# Patient Record
Sex: MALE | Race: WHITE | NOT HISPANIC OR LATINO | Employment: OTHER | URBAN - METROPOLITAN AREA
[De-identification: names, ages, dates, MRNs, and addresses within clinical notes are randomized per-mention and may not be internally consistent; named-entity substitution may affect disease eponyms.]

---

## 2020-11-17 ENCOUNTER — E-VISIT (OUTPATIENT)
Dept: FAMILY MEDICINE CLINIC | Facility: TELEHEALTH | Age: 55
End: 2020-11-17

## 2020-11-17 DIAGNOSIS — R05.9 COUGH: Primary | ICD-10-CM

## 2020-11-17 PROCEDURE — 99422 OL DIG E/M SVC 11-20 MIN: CPT | Performed by: NURSE PRACTITIONER

## 2020-11-17 RX ORDER — BENZONATATE 100 MG/1
100 CAPSULE ORAL 3 TIMES DAILY PRN
Qty: 21 CAPSULE | Refills: 0 | OUTPATIENT
Start: 2020-11-17 | End: 2021-04-17

## 2020-11-17 NOTE — PATIENT INSTRUCTIONS
-Go to nearest [facility] for your covid test, wear your mask, and call when you arrive. We will call you in 1-5 days with your results and they will automatically load to [employer] once resulted. If your symptoms change, get worse, or do not improve in 3-5 go to urgent care for evaluation.    Please self-isolate for 10 days from the start of your symptoms. You can end your quarantine once it has been 10 days from symptom onset, your symptoms have improved, you have been without fever for 24 hours and you have not taken fever-reducing meds for 24 hours.      How to Quarantine at Home  Information for Patients and Families    These instructions are for people with confirmed or suspected COVID-19 who do not need to be hospitalized and those with confirmed COVID-19 who were hospitalized and discharged to care for themselves at home.    If you were tested through the Health Department  The Health Department will monitor your wellbeing.  If it is determined that you do not need to be hospitalized and can be isolated at home, you will be monitored by staff from your local or state health department.     If you were tested through a Commercial [facility]  You will need to monitor yourself and report changes in your symptoms to your doctor.  See the section below called Monitor Your Symptoms.    Follow these steps until a healthcare provider or local or state health department says you can return to your normal activities.    Stay home except to get medical care  • Restrict activities outside your home, except for getting medical care.   • Do not go to work, school, or public areas.   • Avoid using public transportation, ride-sharing, or taxis.    Separate yourself from other people and animals in your home  People  As much as possible, you should stay in a specific room and away from other people in your home. Also, you should use a separate bathroom, if available.    Animals  You should restrict contact with pets and  other animals while you are sick with COVID-19, just like you would around other people. When possible, have another member of your household care for your animals while you are sick. If you are sick with COVID-19, avoid contact with your pet, including petting, snuggling, being kissed or licked, and sharing food. If you must care for your pet or be around animals while you are sick, wash your hands before and after you interact with pets and wear a facemask. See COVID-19 and Animals for more information.    Call ahead before visiting your doctor  If you have a medical appointment, call the healthcare provider and tell them that you have or may have COVID-19. This information will help the healthcare provider’s office take steps to keep other people from getting infected or exposed.    Wear a facemask  You should wear a facemask when you are around other people (e.g., sharing a room or vehicle) or pets and before you enter a healthcare provider’s office.     If you are not able to wear a facemask (for example, because it causes trouble breathing), then people who live with you should not stay in the same room with you, or they should wear a facemask if they enter your room.    Cover your coughs and sneezes  • Cover your mouth and nose with a tissue when you cough or sneeze.   • Throw used tissues in a lined trash can.   • Immediately wash your hands with soap and water for at least 20 seconds or, if soap and water are not available, clean your hands with an alcohol-based hand  that contains at least 60% alcohol.    Clean your hands often  • Wash your hands often with soap and water for at least 20 seconds, especially after blowing your nose, coughing, or sneezing; going to the bathroom; and before eating or preparing food.     • If soap and water are not readily available, use an alcohol-based hand  with at least 60% alcohol, covering all surfaces of your hands and rubbing them together until they  feel dry.    • Soap and water are the best option if hands are visibly dirty. Avoid touching your eyes, nose, and mouth with unwashed hands.    Avoid sharing personal household items  • You should not share dishes, drinking glasses, cups, eating utensils, towels, or bedding with other people or pets in your home.   • After using these items, they should be washed thoroughly with soap and water.    Clean all “high-touch” surfaces everyday  • High touch surfaces include counters, tabletops, doorknobs, bathroom fixtures, toilets, phones, keyboards, tablets, and bedside tables.   • Also, clean any surfaces that may have blood, stool, or body fluids on them.   • Use a household cleaning spray or wipe, according to the label instructions. Labels contain instructions for safe and effective use of the cleaning product, including precautions you should take when applying the product, such as wearing gloves and making sure you have good ventilation during use of the product.    Monitor your symptoms  • Seek prompt medical attention if your illness is worsening (e.g., difficulty breathing).   • Before seeking care, call your healthcare provider and tell them that you have, or are being evaluated for, COVID-19.   • Put on a facemask before you enter the facility.     • These steps will help the healthcare provider’s office to keep other people in the office or waiting room from getting infected or exposed.   • Persons who are placed under active monitoring or facilitated self-monitoring should follow instructions provided by their local health department or occupational health professionals, as appropriate.  • If you have a medical emergency and need to call 911, notify the dispatch personnel that you have, or are being evaluated for COVID-19. If possible, put on a facemask before emergency medical services arrive.    Discontinuing home isolation  Patients with confirmed COVID-19 should remain under home isolation precautions  until the risk of secondary transmission to others is thought to be low. The decision to discontinue home isolation precautions should be made on a case-by-case basis, in consultation with healthcare providers and state and local health departments.    The below content are for household members, intimate partners, and caregivers of a patient with symptomatic laboratory-confirmed COVID-19 or a patient under investigation:    Household members, intimate partners, and caregivers may have close contact with a person with symptomatic, laboratory-confirmed COVID-19 or a person under investigation.     Close contacts should monitor their health; they should call their healthcare provider right away if they develop symptoms suggestive of COVID-19 (e.g., fever, cough, shortness of breath)     Close contacts should also follow these recommendations:  • Make sure that you understand and can help the patient follow their healthcare provider’s instructions for medication(s) and care. You should help the patient with basic needs in the home and provide support for getting groceries, prescriptions, and other personal needs.  • Monitor the patient’s symptoms. If the patient is getting sicker, call his or her healthcare provider and tell them that the patient has laboratory-confirmed COVID-19. This will help the healthcare provider’s office take steps to keep other people in the office or waiting room from getting infected. Ask the healthcare provider to call the local or state health department for additional guidance. If the patient has a medical emergency and you need to call 911, notify the dispatch personnel that the patient has, or is being evaluated for COVID-19.  • Household members should stay in another room or be  from the patient as much as possible. Household members should use a separate bedroom and bathroom, if available.  • Prohibit visitors who do not have an essential need to be in the home.  • Household  members should care for any pets in the home. Do not handle pets or other animals while sick.  For more information, see COVID-19 and Animals.  • Make sure that shared spaces in the home have good air flow, such as by an air conditioner or an opened window, weather permitting.  • Perform hand hygiene frequently. Wash your hands often with soap and water for at least 20 seconds or use an alcohol-based hand  that contains 60 to 95% alcohol, covering all surfaces of your hands and rubbing them together until they feel dry. Soap and water should be used preferentially if hands are visibly dirty.  • Avoid touching your eyes, nose, and mouth with unwashed hands.  • The patient should wear a facemask when you are around other people. If the patient is not able to wear a facemask (for example, because it causes trouble breathing), you, as the caregiver, should wear a mask when you are in the same room as the patient.  • Wear a disposable facemask and gloves when you touch or have contact with the patient’s blood, stool, or body fluids, such as saliva, sputum, nasal mucus, vomit, or urine.   o Throw out disposable facemasks and gloves after using them. Do not reuse.  o When removing personal protective equipment, first remove and dispose of gloves. Then, immediately clean your hands with soap and water or alcohol-based hand . Next, remove and dispose of facemask, and immediately clean your hands again with soap and water or alcohol-based hand .  • Avoid sharing household items with the patient. You should not share dishes, drinking glasses, cups, eating utensils, towels, bedding, or other items. After the patient uses these items, you should wash them thoroughly (see below “Wash laundry thoroughly”).  • Clean all “high-touch” surfaces, such as counters, tabletops, doorknobs, bathroom fixtures, toilets, phones, keyboards, tablets, and bedside tables, every day. Also, clean any surfaces that may have  blood, stool, or body fluids on them.   o Use a household cleaning spray or wipe, according to the label instructions. Labels contain instructions for safe and effective use of the cleaning product including precautions you should take when applying the product, such as wearing gloves and making sure you have good ventilation during use of the product.  • Wash laundry thoroughly.   o Immediately remove and wash clothes or bedding that have blood, stool, or body fluids on them.  o Wear disposable gloves while handling soiled items and keep soiled items away from your body. Clean your hands (with soap and water or an alcohol-based hand ) immediately after removing your gloves.  o Read and follow directions on labels of laundry or clothing items and detergent. In general, using a normal laundry detergent according to washing machine instructions and dry thoroughly using the warmest temperatures recommended on the clothing label.  • Place all used disposable gloves, facemasks, and other contaminated items in a lined container before disposing of them with other household waste. Clean your hands (with soap and water or an alcohol-based hand ) immediately after handling these items. Soap and water should be used preferentially if hands are visibly dirty.  • Discuss any additional questions with your state or local health department or healthcare provider.    Adapted from information provided by the Centers for Disease Control and Prevention.  For more information, visit https://www.cdc.gov/coronavirus/2019-ncov/hcp/guidance-prevent-spread.html    Cough, Adult  Coughing is a reflex that clears your throat and your airways (respiratory system). Coughing helps to heal and protect your lungs. It is normal to cough occasionally, but a cough that happens with other symptoms or lasts a long time may be a sign of a condition that needs treatment. An acute cough may only last 2-3 weeks, while a chronic cough may  last 8 or more weeks.  Coughing is commonly caused by:  · Infection of the respiratory systemby viruses or bacteria.  · Breathing in substances that irritate your lungs.  · Allergies.  · Asthma.  · Mucus that runs down the back of your throat (postnasal drip).  · Smoking.  · Acid backing up from the stomach into the esophagus (gastroesophageal reflux).  · Certain medicines.  · Chronic lung problems.  · Other medical conditions such as heart failure or a blood clot in the lung (pulmonary embolism).  Follow these instructions at home:  Medicines  · Take over-the-counter and prescription medicines only as told by your health care provider.  · Talk with your health care provider before you take a cough suppressant medicine.  Lifestyle    · Avoid cigarette smoke. Do not use any products that contain nicotine or tobacco, such as cigarettes, e-cigarettes, and chewing tobacco. If you need help quitting, ask your health care provider.  · Drink enough fluid to keep your urine pale yellow.  · Avoid caffeine.  · Do not drink alcohol if your health care provider tells you not to drink.  General instructions    · Pay close attention to changes in your cough. Tell your health care provider about them.  · Always cover your mouth when you cough.  · Avoid things that make you cough, such as perfume, candles, cleaning products, or campfire or tobacco smoke.  · If the air is dry, use a cool mist vaporizer or humidifier in your bedroom or your home to help loosen secretions.  · If your cough is worse at night, try to sleep in a semi-upright position.  · Rest as needed.  · Keep all follow-up visits as told by your health care provider. This is important.  Contact a health care provider if you:  · Have new symptoms.  · Cough up pus.  · Have a cough that does not get better after 2-3 weeks or gets worse.  · Cannot control your cough with cough suppressant medicines and you are losing sleep.  · Have pain that gets worse or pain that is not  helped with medicine.  · Have a fever.  · Have unexplained weight loss.  · Have night sweats.  Get help right away if:  · You cough up blood.  · You have difficulty breathing.  · Your heartbeat is very fast.  These symptoms may represent a serious problem that is an emergency. Do not wait to see if the symptoms will go away. Get medical help right away. Call your local emergency services (911 in the [place] Do not drive yourself to the hospital.  Summary  · Coughing is a reflex that clears your throat and your airways. It is normal to cough occasionally, but a cough that happens with other symptoms or lasts a long time may be a sign of a condition that needs treatment.  · Take over-the-counter and prescription medicines only as told by your health care provider.  · Always cover your mouth when you cough.  · Contact a health care provider if you have new symptoms or a cough that does not get better after 2-3 weeks or gets worse.  This information is not intended to replace advice given to you by your health care provider. Make sure you discuss any questions you have with your health care provider.  Document Released: 06/15/2012 Document Revised: 01/06/2020 Document Reviewed: 01/06/2020  [employer] Patient Education © 2020 [employer]  Benzonatate capsules  What is this medicine?  BENZONATATE (tye DEE na tony) is used to treat cough.  This medicine may be used for other purposes; ask your health care provider or pharmacist if you have questions.  COMMON BRAND NAME(S): Ollie Benton  What should I tell my health care provider before I take this medicine?  They need to know if you have any of these conditions:  · kidney or liver disease  · an unusual or allergic reaction to benzonatate, anesthetics, other medicines, foods, dyes, or preservatives  · pregnant or trying to get pregnant  · breast-feeding  How should I use this medicine?  Take this medicine by mouth with a glass of water. Follow the directions on  the prescription label. Avoid breaking, chewing, or sucking the capsule, as this can cause serious side effects. Take your medicine at regular intervals. Do not take your medicine more often than directed.  Talk to your pediatrician regarding the use of this medicine in children. While this drug may be prescribed for children as young as 10 years old for selected conditions, precautions do apply.  Overdosage: If you think you have taken too much of this medicine contact a poison control center or emergency room at once.  NOTE: This medicine is only for you. Do not share this medicine with others.  What if I miss a dose?  If you miss a dose, take it as soon as you can. If it is almost time for your next dose, take only that dose. Do not take double or extra doses.  What may interact with this medicine?  Do not take this medicine with any of the following medications:  · MAOIs like Carbex, Eldepryl, Marplan, Nardil, and Parnate  This list may not describe all possible interactions. Give your health care provider a list of all the medicines, herbs, non-prescription drugs, or dietary supplements you use. Also tell them if you smoke, drink alcohol, or use illegal drugs. Some items may interact with your medicine.  What should I watch for while using this medicine?  Tell your doctor if your symptoms do not improve or if they get worse. If you have a high fever, skin rash, or headache, see your health care professional.  You may get drowsy or dizzy. Do not drive, use machinery, or do anything that needs mental alertness until you know how this medicine affects you. Do not sit or stand up quickly, especially if you are an older patient. This reduces the risk of dizzy or fainting spells.  What side effects may I notice from receiving this medicine?  Side effects that you should report to your doctor or health care professional as soon as possible:  · allergic reactions like skin rash, itching or hives, swelling of the face,  lips, or tongue  · breathing problems  · chest pain  · confusion or hallucinations  · irregular heartbeat  · numbness of mouth or throat  · seizures  Side effects that usually do not require medical attention (report to your doctor or health care professional if they continue or are bothersome):  · burning feeling in the eyes  · constipation  · headache  · nasal congestion  · stomach upset  This list may not describe all possible side effects. Call your doctor for medical advice about side effects. You may report side effects to FDA at 1-428-JTZ-3654.  Where should I keep my medicine?  Keep out of the reach of children.  Store at room temperature between 15 and 30 degrees C (59 and 86 degrees F). Keep tightly closed. Protect from light and moisture. Throw away any unused medicine after the expiration date.  NOTE: This sheet is a summary. It may not cover all possible information. If you have questions about this medicine, talk to your doctor, pharmacist, or health care provider.  © 2020 [employer] (2009-03-18 14:52:56)

## 2020-11-17 NOTE — PROGRESS NOTES
I reviewed the patient's e-visit.  I ordered a covid test and tessalon pearles. I recommend a 10 day quarantine based on CDC guidelines. Other recommendations found in AVS.    I spent 11-20 minutes in the patient's chart.

## 2021-04-17 ENCOUNTER — APPOINTMENT (OUTPATIENT)
Dept: GENERAL RADIOLOGY | Facility: HOSPITAL | Age: 56
End: 2021-04-17

## 2021-04-17 ENCOUNTER — HOSPITAL ENCOUNTER (EMERGENCY)
Facility: HOSPITAL | Age: 56
Discharge: HOME | End: 2021-04-17
Admitting: EMERGENCY MEDICINE

## 2021-04-17 VITALS
WEIGHT: 211.64 LBS | OXYGEN SATURATION: 99 % | BODY MASS INDEX: 28.67 KG/M2 | DIASTOLIC BLOOD PRESSURE: 77 MMHG | SYSTOLIC BLOOD PRESSURE: 136 MMHG | HEIGHT: 72 IN | HEART RATE: 88 BPM | RESPIRATION RATE: 16 BRPM | TEMPERATURE: 98.2 F

## 2021-04-17 DIAGNOSIS — S39.012A STRAIN OF LUMBAR REGION, INITIAL ENCOUNTER: Primary | ICD-10-CM

## 2021-04-17 PROCEDURE — 63710000001 ONDANSETRON ODT 4 MG TABLET DISPERSIBLE: Performed by: NURSE PRACTITIONER

## 2021-04-17 PROCEDURE — 96372 THER/PROPH/DIAG INJ SC/IM: CPT

## 2021-04-17 PROCEDURE — 99284 EMERGENCY DEPT VISIT MOD MDM: CPT

## 2021-04-17 PROCEDURE — 25010000002 KETOROLAC TROMETHAMINE PER 15 MG: Performed by: NURSE PRACTITIONER

## 2021-04-17 PROCEDURE — 25010000002 HYDROMORPHONE PER 4 MG: Performed by: NURSE PRACTITIONER

## 2021-04-17 RX ORDER — KETOROLAC TROMETHAMINE 30 MG/ML
30 INJECTION, SOLUTION INTRAMUSCULAR; INTRAVENOUS ONCE
Status: COMPLETED | OUTPATIENT
Start: 2021-04-17 | End: 2021-04-17

## 2021-04-17 RX ORDER — HYDROMORPHONE HCL 110MG/55ML
1 PATIENT CONTROLLED ANALGESIA SYRINGE INTRAVENOUS ONCE
Status: COMPLETED | OUTPATIENT
Start: 2021-04-17 | End: 2021-04-17

## 2021-04-17 RX ORDER — ONDANSETRON 4 MG/1
4 TABLET, ORALLY DISINTEGRATING ORAL ONCE
Status: COMPLETED | OUTPATIENT
Start: 2021-04-17 | End: 2021-04-17

## 2021-04-17 RX ADMIN — KETOROLAC TROMETHAMINE 30 MG: 30 INJECTION, SOLUTION INTRAMUSCULAR at 14:11

## 2021-04-17 RX ADMIN — ONDANSETRON 4 MG: 4 TABLET, ORALLY DISINTEGRATING ORAL at 14:11

## 2021-04-17 RX ADMIN — HYDROMORPHONE HYDROCHLORIDE 1 MG: 2 INJECTION, SOLUTION INTRAMUSCULAR; INTRAVENOUS; SUBCUTANEOUS at 14:11

## 2021-04-17 NOTE — ED PROVIDER NOTES
"Subjective   Chief complaint: Back pain      Context: Patient is a 25-year-old male who comes in complaining of low back pain that is worse and reproducible with range of motion.  He denies any saddle anesthesia bowel or bladder incontinence or retention.  He denies any testicular pain or numbness.  He states he has been ambulatory and weightbearing without any weakness or falls.  He states he has had some recent lifting bending and twisting over the last couple days without any known inciting incident with a gradual onset of pain.  He states he took an ibuprofen without much relief.  He went to urgent care this morning was given prescriptions for Flexeril naproxen and prednisone which she has not yet started.  He states he has not had any decompensation of his symptoms but wanted to \"get an MRI.\"  He denies any history of chemoradiation osteoporosis IV drug use chronic steroid use fever or systemic illness.    Duration: 3 days    Timing: Waxes and wanes    Severity: Moderate to severe    Associated symptoms: Reproducible and worse with range of motion          PCP:               Review of Systems   Constitutional: Negative for fever.   Respiratory: Negative.    Gastrointestinal: Negative.    Genitourinary: Negative.    Musculoskeletal: Positive for back pain. Negative for neck stiffness.   Allergic/Immunologic: Negative for immunocompromised state.   Neurological: Negative.    Hematological: Does not bruise/bleed easily.       No past medical history on file.    Allergies   Allergen Reactions   • Codeine Hives       No past surgical history on file.    No family history on file.    Social History     Socioeconomic History   • Marital status: Single     Spouse name: Not on file   • Number of children: Not on file   • Years of education: Not on file   • Highest education level: Not on file   Tobacco Use   • Smoking status: Never Smoker   • Smokeless tobacco: Never Used   Vaping Use   • Vaping Use: Never used "   Substance and Sexual Activity   • Alcohol use: Not Currently   • Drug use: Defer   • Sexual activity: Defer           Objective   Physical Exam     Vital signs and traige nurse note reviewed.  Constitutional:  Awake, alert; well developed and well nourished.  No acute distress, the patient is examined in hospital gown.  HEENT:  Normocephalic, atraumatic; pupils are PERRL with intact EOM; oropharynx is pink and moist without edema or erythema.  Neck: Supple, full range of motion without pain;   Cardiovascular: Regular rate and rhythm, normal S1-S2. .  Pulmonary: Respiratory effort regular, nonlabored; breath sounds CTA without wheezing or rhonchi.  Abdomen: Soft, nontender, nondistended with normoactive bowel sounds; no rebound or guarding.  No CVAT  Musculoskeletal: Independent range of motion of all extremities, no palpable tenderness or edema.   Back:  Spine is midline and without midline tenderness on palpation of cervical, thoracic, and lumbar spine; paraspinal musculature is nontender and without soft tissue swelling, overlying ecchymosis or erythema. ROM elicits pain,  Distal muscle strength is 5/5.  Patient does have some pain to palpation in the lumbar area when flexing forward approximately 20 degrees but not when standing straight  Neuro: Alert oriented x3, speech is clear and appropriate. DTRs are symmetrical bilateral lower extremity, negative Babinski BLE, negative straight leg raise BLE, strong and equal dorsiflexion of bilateral great toes L4, L5, S1 motor sensory intact.        Procedures           ED Course  ED Course as of Apr 17 1454   Sat Apr 17, 2021   1439 Rn reports pt refused xr      [JW]      ED Course User Index  [JW] Althea Hernández, BRITTNEY           Labs Reviewed - No data to display  Medications   ketorolac (TORADOL) injection 30 mg (30 mg Intramuscular Given 4/17/21 1411)   ondansetron ODT (ZOFRAN-ODT) disintegrating tablet 4 mg (4 mg Oral Given 4/17/21 1411)   HYDROmorphone  (DILAUDID) injection 1 mg (1 mg Subcutaneous Given 4/17/21 1411)     No radiology results for the last day  Prior to Admission medications    Medication Sig Start Date End Date Taking? Authorizing Provider   cyclobenzaprine (FLEXERIL) 10 MG tablet Take 1 tablet by mouth 3 (Three) Times a Day As Needed for Muscle Spasms. 4/17/21   Nighat Melissa APRN   naproxen (EC NAPROSYN) 500 MG EC tablet Take 1 tablet by mouth 2 (Two) Times a Day As Needed (pain). 4/17/21   Nighat Melissa APRN   predniSONE (DELTASONE) 10 MG (21) dose pack Take  by mouth Daily. Use as directed on package 4/17/21   Nighat Melissa APRN   benzonatate (Tessalon Perles) 100 MG capsule Take 1 capsule by mouth 3 (Three) Times a Day As Needed for Cough. 11/17/20 4/17/21  Holly Zamora APRN   cyclobenzaprine (FLEXERIL) 10 MG tablet 1 tablet p.o. every 8 hours as needed 1/28/20 4/17/21  Héctor Darnell MD   ibuprofen (ADVIL,MOTRIN) 800 MG tablet 1 tablet p.o. every 8 hours with food as needed pain 1/28/20 4/17/21  Héctor Darnell MD                                     MDM  Number of Diagnoses or Management Options  Strain of lumbar region, initial encounter  Diagnosis management comments: Chart review: Seen in urgent care 4/17/2021      Comorbidities:  has no past medical history on file.  Differentials: Strain sciatica herniation bulging disc not all inclusive of differentials considered  Radiology interpretation: Refused    Appropriate PPE worn during exam.  Patient was given analgesics.  He refused x-ray.  Discussed nonemergent MRI imaging would not be done in the ER and he would need follow-up, he verbalizes understanding and continues to refuse x-ray and states he will follow-up with his PCP for imaging.    i discussed findings with patient who voices understanding of discharge instructions, signs and symptoms requiring return to ED; discharged improved and in stable condition with follow up for re-evaluation.        Final  diagnoses:   Strain of lumbar region, initial encounter       ED Disposition  ED Disposition     ED Disposition Condition Comment    Discharge Stable           PATIENT CONNECTION - [facility] [address]  [phone]  Schedule an appointment as soon as possible for a visit            Medication List      No changes were made to your prescriptions during this visit.          Althea Hernández, APRN  04/17/21 1458

## 2021-04-17 NOTE — ED NOTES
"Patient reports to X ray tech while this nurse is in the room\" I do not want an Xray I want an MRI, last time I had an Xray it did not show anything wrong and they said I might need an MRI\" provider notified     Leticia Coates RN  04/17/21 2619    "

## 2021-04-17 NOTE — DISCHARGE INSTRUCTIONS
Medications as previously prescribed ; light massage and range of motion exercises and improve the discomfort; back exercises 2-3 times daily; no heavy lifting, repeated bending or stooping for 3-5 days, gradually increase activity as tolerated by pain; return for numbness to the inner thigh, genitals or rectum, loss of control of your bowels or bladder, inability to urinate or worsening pain or symptoms. Follow up with primary care physician if no improvement in 2-3 days  Previously prescribed

## 2021-06-18 ENCOUNTER — HOSPITAL ENCOUNTER (EMERGENCY)
Facility: HOSPITAL | Age: 56
Discharge: HOME | End: 2021-06-18
Admitting: EMERGENCY MEDICINE

## 2021-06-18 VITALS
SYSTOLIC BLOOD PRESSURE: 131 MMHG | RESPIRATION RATE: 16 BRPM | TEMPERATURE: 97.8 F | BODY MASS INDEX: 28.46 KG/M2 | OXYGEN SATURATION: 98 % | HEART RATE: 86 BPM | DIASTOLIC BLOOD PRESSURE: 84 MMHG | HEIGHT: 72 IN | WEIGHT: 210.1 LBS

## 2021-06-18 DIAGNOSIS — T15.92XA FOREIGN BODY OF LEFT EYE, INITIAL ENCOUNTER: ICD-10-CM

## 2021-06-18 DIAGNOSIS — H16.203 KERATOCONJUNCTIVITIS OF BOTH EYES: Primary | ICD-10-CM

## 2021-06-18 PROCEDURE — 99283 EMERGENCY DEPT VISIT LOW MDM: CPT

## 2021-06-18 RX ORDER — PROPARACAINE HYDROCHLORIDE 5 MG/ML
2 SOLUTION/ DROPS OPHTHALMIC ONCE
Status: COMPLETED | OUTPATIENT
Start: 2021-06-18 | End: 2021-06-18

## 2021-06-18 RX ADMIN — PROPARACAINE HYDROCHLORIDE 2 DROP: 5 SOLUTION/ DROPS OPHTHALMIC at 19:40

## 2021-06-18 RX ADMIN — FLUORESCEIN SODIUM 1 STRIP: 1 STRIP OPHTHALMIC at 19:40

## 2021-06-18 NOTE — DISCHARGE INSTRUCTIONS
Purchase over-the-counter artificial tears and use as directed.  Wear safety glasses when working.  It is important to establish a primary care provider for your primary care needs.  Schedule follow-up with Dr. Moore.  Schedule follow-up with Dr. Olson on Monday.  Return to the ER for new or worsening symptoms.

## 2021-06-18 NOTE — ED PROVIDER NOTES
"Subjective   55-year-old male presents with complaint of foreign body sensation under both of his eyelids.  Patient reports that he began \"rubbing\" his eyes approximately 1 week ago and thinks that he got an eyelash in there.  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  Denies fever sweats chills.  Denies visual disturbance.  Denies drainage.    1. Location: bilateral eyelids  2. Quality: FB sensation  3. Severity: moderate  4. Worsening factors: rubbing  5. Alleviating factors: rest  6. Onset: 1 week  7. Radiation: denies  8. Frequency: constant   9. Co-morbidities: No past medical history on file.  10. Source: patient and spouse            Review of Systems   Constitutional: Negative for chills, diaphoresis and fever.   HENT: Negative for congestion, ear discharge, postnasal drip, rhinorrhea, sinus pain, sore throat, trouble swallowing and voice change.    Eyes: Positive for pain and redness. Negative for photophobia, discharge, itching and visual disturbance.   Musculoskeletal: Negative for neck pain.   Skin: Negative for rash.   Hematological: Negative for adenopathy.   All other systems reviewed and are negative.      No past medical history on file.    Allergies   Allergen Reactions   • Codeine Hives       No past surgical history on file.    No family history on file.    Social History     Socioeconomic History   • Marital status:      Spouse name: Not on file   • Number of children: Not on file   • Years of education: Not on file   • Highest education level: Not on file   Tobacco Use   • Smoking status: Never Smoker   • Smokeless tobacco: Never Used   Vaping Use   • Vaping Use: Never used   Substance and Sexual Activity   • Alcohol use: Not Currently   • Drug use: Defer   • Sexual activity: Defer           Objective   Physical Exam  Vitals and nursing note reviewed.   Constitutional:       General: He is awake.      Appearance: Normal appearance. He is " well-developed and normal weight.   HENT:      Head: Normocephalic and atraumatic. No right periorbital erythema or left periorbital erythema.   Eyes:      General: Lids are normal. Lids are everted, no foreign bodies appreciated. Vision grossly intact. Gaze aligned appropriately.         Right eye: No discharge.         Left eye: No discharge.      Extraocular Movements: Extraocular movements intact.      Conjunctiva/sclera:      Right eye: Right conjunctiva is injected.      Left eye: Left conjunctiva is injected.      Pupils: Pupils are equal, round, and reactive to light.      Right eye: No corneal abrasion or fluorescein uptake. Jie exam negative.      Left eye: No corneal abrasion or fluorescein uptake. Jie exam negative.     Slit lamp exam:     Right eye: No foreign body or hyphema.      Left eye: Foreign body present. No hyphema.        Comments: OS: 20/50    OD: 20/30    OU: 20/30   Pulmonary:      Effort: Pulmonary effort is normal.   Musculoskeletal:      Cervical back: Full passive range of motion without pain, normal range of motion and neck supple.   Lymphadenopathy:      Cervical: No cervical adenopathy.   Skin:     General: Skin is warm and dry.      Capillary Refill: Capillary refill takes less than 2 seconds.      Findings: No rash.   Neurological:      General: No focal deficit present.      Mental Status: He is alert and oriented to person, place, and time.      GCS: GCS eye subscore is 4. GCS verbal subscore is 5. GCS motor subscore is 6.   Psychiatric:         Mood and Affect: Mood normal.         Behavior: Behavior normal. Behavior is cooperative.         Thought Content: Thought content normal.         Judgment: Judgment normal.         Procedures           ED Course      No radiology results for the last day  Medications   fluorescein ophthalmic strip 1 strip (has no administration in time range)   proparacaine (ALCAINE) 0.5 % ophthalmic solution 2 drop (has no administration in time  "range)     Labs Reviewed - No data to display                                       MDM  Number of Diagnoses or Management Options  Foreign body of left eye, initial encounter  Keratoconjunctivitis of both eyes  Diagnosis management comments: Chart Review: 4/17/2021 patient was seen for lumbar strain.  Comorbidity: No past medical history on file.    Patient undressed and placed in gown for exam.  Appropriate PPE worn during patient exam. 55-year-old male presents with complaint of foreign body sensation under both of his eyelids.  Patient reports that he began \"rubbing\" his eyes approximately 1 week ago and thinks that he got an eyelash in there.  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  Denies fever sweats chills.  Denies visual disturbance.  Denies drainage.  Eyelids were inverted and no appreciated foreign bodies noted.  There was a small foreign body removed with the slit lamp with no appreciated abrasions.  Noted to have pooling of fluorescein on the lower eyelid, consistent with keratoconjunctivitis Pamela.  Patient was instructed to instill artificial tears.  He does not regularly see the optometrist.  He also is noncompliant with wearing eye protection while working.  He does not have primary care established, was given follow-up with provider on call.    Disposition/Treatment: Discussed results with patient, verbalized understanding.  Discussed reasons to return to the ER, patient verbalized understanding.  Agreeable with plan of care.  Patient was stable upon discharge.    EMR Dragon transcription disclaimer:  Some of this encounter note is an electronic transcription translation of spoken language to printed text. The electronic translation of spoken language may permit erroneous, or at times, nonsensical words or phrases to be inadvertently transcribed; Although I have reviewed the note for such errors some may still exist.           Patient Progress  Patient " progress: stable      Final diagnoses:   Keratoconjunctivitis of both eyes   Foreign body of left eye, initial encounter       ED Disposition  ED Disposition     ED Disposition Condition Comment    Discharge Stable           Favian Moore MD  [address]  [facility] [address] [phone]    Schedule an appointment as soon as possible for a visit       [facility]  [address] [address] [phone]  Schedule an appointment as soon as possible for a visit on 6/21/2021      [facility] EMERGENCY DEPARTMENT  [address] [facility] [address] [phone]  Go to   If symptoms worsen         Medication List      No changes were made to your prescriptions during this visit.          Karen Blount, APRN  06/18/21 1936

## 2023-11-23 ENCOUNTER — APPOINTMENT (OUTPATIENT)
Dept: GENERAL RADIOLOGY | Facility: HOSPITAL | Age: 58
End: 2023-11-23
Payer: COMMERCIAL

## 2023-11-23 ENCOUNTER — APPOINTMENT (OUTPATIENT)
Dept: CT IMAGING | Facility: HOSPITAL | Age: 58
End: 2023-11-23
Payer: COMMERCIAL

## 2023-11-23 ENCOUNTER — HOSPITAL ENCOUNTER (EMERGENCY)
Facility: HOSPITAL | Age: 58
Discharge: LEFT BEFORE DISCHARGE | End: 2023-11-23
Attending: EMERGENCY MEDICINE | Admitting: STUDENT IN AN ORGANIZED HEALTH CARE EDUCATION/TRAINING PROGRAM
Payer: COMMERCIAL

## 2023-11-23 VITALS
OXYGEN SATURATION: 94 % | BODY MASS INDEX: 28.44 KG/M2 | TEMPERATURE: 97.9 F | SYSTOLIC BLOOD PRESSURE: 147 MMHG | DIASTOLIC BLOOD PRESSURE: 95 MMHG | HEART RATE: 102 BPM | WEIGHT: 210 LBS | HEIGHT: 72 IN | RESPIRATION RATE: 20 BRPM

## 2023-11-23 DIAGNOSIS — K85.20 ALCOHOL-INDUCED ACUTE PANCREATITIS, UNSPECIFIED COMPLICATION STATUS: Primary | ICD-10-CM

## 2023-11-23 DIAGNOSIS — R10.10 PAIN OF UPPER ABDOMEN: ICD-10-CM

## 2023-11-23 DIAGNOSIS — R07.9 CHEST PAIN, UNSPECIFIED TYPE: ICD-10-CM

## 2023-11-23 DIAGNOSIS — R59.1 LYMPHADENOPATHY: ICD-10-CM

## 2023-11-23 LAB
ALBUMIN SERPL-MCNC: 4.5 G/DL (ref 3.5–5.2)
ALBUMIN/GLOB SERPL: 1.7 G/DL
ALP SERPL-CCNC: 74 U/L (ref 39–117)
ALT SERPL W P-5'-P-CCNC: 61 U/L (ref 1–41)
ANION GAP SERPL CALCULATED.3IONS-SCNC: 14 MMOL/L (ref 5–15)
AST SERPL-CCNC: 115 U/L (ref 1–40)
BACTERIA UR QL AUTO: NORMAL /HPF
BASOPHILS # BLD AUTO: 0.1 10*3/MM3 (ref 0–0.2)
BASOPHILS NFR BLD AUTO: 0.4 % (ref 0–1.5)
BILIRUB SERPL-MCNC: 0.4 MG/DL (ref 0–1.2)
BILIRUB UR QL STRIP: NEGATIVE
BUN SERPL-MCNC: 16 MG/DL (ref 6–20)
BUN/CREAT SERPL: 12.8 (ref 7–25)
CALCIUM SPEC-SCNC: 10.2 MG/DL (ref 8.6–10.5)
CHLORIDE SERPL-SCNC: 104 MMOL/L (ref 98–107)
CLARITY UR: CLEAR
CO2 SERPL-SCNC: 24 MMOL/L (ref 22–29)
COLOR UR: YELLOW
CREAT SERPL-MCNC: 1.25 MG/DL (ref 0.76–1.27)
DEPRECATED RDW RBC AUTO: 45.1 FL (ref 37–54)
EGFRCR SERPLBLD CKD-EPI 2021: 67.2 ML/MIN/1.73
EOSINOPHIL # BLD AUTO: 0.1 10*3/MM3 (ref 0–0.4)
EOSINOPHIL NFR BLD AUTO: 0.5 % (ref 0.3–6.2)
ERYTHROCYTE [DISTWIDTH] IN BLOOD BY AUTOMATED COUNT: 14.2 % (ref 12.3–15.4)
ETHANOL UR QL: 0.02 %
GLOBULIN UR ELPH-MCNC: 2.7 GM/DL
GLUCOSE SERPL-MCNC: 143 MG/DL (ref 65–99)
GLUCOSE UR STRIP-MCNC: NEGATIVE MG/DL
HCT VFR BLD AUTO: 42.1 % (ref 37.5–51)
HGB BLD-MCNC: 14.3 G/DL (ref 13–17.7)
HGB UR QL STRIP.AUTO: NEGATIVE
HOLD SPECIMEN: NORMAL
HOLD SPECIMEN: NORMAL
HYALINE CASTS UR QL AUTO: NORMAL /LPF
KETONES UR QL STRIP: ABNORMAL
LEUKOCYTE ESTERASE UR QL STRIP.AUTO: ABNORMAL
LIPASE SERPL-CCNC: 2857 U/L (ref 13–60)
LYMPHOCYTES # BLD AUTO: 2.1 10*3/MM3 (ref 0.7–3.1)
LYMPHOCYTES NFR BLD AUTO: 13.7 % (ref 19.6–45.3)
MCH RBC QN AUTO: 29.2 PG (ref 26.6–33)
MCHC RBC AUTO-ENTMCNC: 33.9 G/DL (ref 31.5–35.7)
MCV RBC AUTO: 86.3 FL (ref 79–97)
MONOCYTES # BLD AUTO: 0.8 10*3/MM3 (ref 0.1–0.9)
MONOCYTES NFR BLD AUTO: 5.2 % (ref 5–12)
NEUTROPHILS NFR BLD AUTO: 12.5 10*3/MM3 (ref 1.7–7)
NEUTROPHILS NFR BLD AUTO: 80.2 % (ref 42.7–76)
NITRITE UR QL STRIP: NEGATIVE
NRBC BLD AUTO-RTO: 0.1 /100 WBC (ref 0–0.2)
PH UR STRIP.AUTO: 7 [PH] (ref 5–8)
PLATELET # BLD AUTO: 216 10*3/MM3 (ref 140–450)
PMV BLD AUTO: 9.3 FL (ref 6–12)
POTASSIUM SERPL-SCNC: 3.9 MMOL/L (ref 3.5–5.2)
PROT SERPL-MCNC: 7.2 G/DL (ref 6–8.5)
PROT UR QL STRIP: NEGATIVE
QT INTERVAL: 358 MS
QTC INTERVAL: 438 MS
RBC # BLD AUTO: 4.89 10*6/MM3 (ref 4.14–5.8)
RBC # UR STRIP: NORMAL /HPF
REF LAB TEST METHOD: NORMAL
SODIUM SERPL-SCNC: 142 MMOL/L (ref 136–145)
SP GR UR STRIP: 1.02 (ref 1–1.03)
SQUAMOUS #/AREA URNS HPF: NORMAL /HPF
TROPONIN T SERPL HS-MCNC: <6 NG/L
UROBILINOGEN UR QL STRIP: ABNORMAL
WBC # UR STRIP: NORMAL /HPF
WBC NRBC COR # BLD AUTO: 15.6 10*3/MM3 (ref 3.4–10.8)
WHOLE BLOOD HOLD COAG: NORMAL
WHOLE BLOOD HOLD SPECIMEN: NORMAL

## 2023-11-23 PROCEDURE — 96374 THER/PROPH/DIAG INJ IV PUSH: CPT

## 2023-11-23 PROCEDURE — 96375 TX/PRO/DX INJ NEW DRUG ADDON: CPT

## 2023-11-23 PROCEDURE — 25010000002 HYDROMORPHONE 1 MG/ML SOLUTION: Performed by: PHYSICIAN ASSISTANT

## 2023-11-23 PROCEDURE — 80053 COMPREHEN METABOLIC PANEL: CPT | Performed by: PHYSICIAN ASSISTANT

## 2023-11-23 PROCEDURE — 71045 X-RAY EXAM CHEST 1 VIEW: CPT

## 2023-11-23 PROCEDURE — 83690 ASSAY OF LIPASE: CPT | Performed by: PHYSICIAN ASSISTANT

## 2023-11-23 PROCEDURE — 36415 COLL VENOUS BLD VENIPUNCTURE: CPT

## 2023-11-23 PROCEDURE — 25010000002 ONDANSETRON PER 1 MG: Performed by: PHYSICIAN ASSISTANT

## 2023-11-23 PROCEDURE — 25810000003 SODIUM CHLORIDE 0.9 % SOLUTION: Performed by: PHYSICIAN ASSISTANT

## 2023-11-23 PROCEDURE — 93005 ELECTROCARDIOGRAM TRACING: CPT | Performed by: EMERGENCY MEDICINE

## 2023-11-23 PROCEDURE — 84484 ASSAY OF TROPONIN QUANT: CPT | Performed by: EMERGENCY MEDICINE

## 2023-11-23 PROCEDURE — 85025 COMPLETE CBC W/AUTO DIFF WBC: CPT | Performed by: PHYSICIAN ASSISTANT

## 2023-11-23 PROCEDURE — 82077 ASSAY SPEC XCP UR&BREATH IA: CPT | Performed by: PHYSICIAN ASSISTANT

## 2023-11-23 PROCEDURE — 25510000001 IOPAMIDOL PER 1 ML: Performed by: EMERGENCY MEDICINE

## 2023-11-23 PROCEDURE — 74177 CT ABD & PELVIS W/CONTRAST: CPT

## 2023-11-23 PROCEDURE — 99285 EMERGENCY DEPT VISIT HI MDM: CPT

## 2023-11-23 PROCEDURE — 81001 URINALYSIS AUTO W/SCOPE: CPT | Performed by: PHYSICIAN ASSISTANT

## 2023-11-23 RX ORDER — ONDANSETRON 2 MG/ML
4 INJECTION INTRAMUSCULAR; INTRAVENOUS ONCE
Status: COMPLETED | OUTPATIENT
Start: 2023-11-23 | End: 2023-11-23

## 2023-11-23 RX ORDER — FAMOTIDINE 10 MG/ML
20 INJECTION, SOLUTION INTRAVENOUS ONCE
Status: COMPLETED | OUTPATIENT
Start: 2023-11-23 | End: 2023-11-23

## 2023-11-23 RX ORDER — SODIUM CHLORIDE 0.9 % (FLUSH) 0.9 %
10 SYRINGE (ML) INJECTION AS NEEDED
Status: DISCONTINUED | OUTPATIENT
Start: 2023-11-23 | End: 2023-11-24 | Stop reason: HOSPADM

## 2023-11-23 RX ORDER — ASPIRIN 81 MG/1
324 TABLET, CHEWABLE ORAL ONCE
Status: COMPLETED | OUTPATIENT
Start: 2023-11-23 | End: 2023-11-23

## 2023-11-23 RX ADMIN — SODIUM CHLORIDE 1000 ML: 9 INJECTION, SOLUTION INTRAVENOUS at 22:52

## 2023-11-23 RX ADMIN — FAMOTIDINE 20 MG: 10 INJECTION INTRAVENOUS at 21:07

## 2023-11-23 RX ADMIN — ASPIRIN 81 MG CHEWABLE TABLET 324 MG: 81 TABLET CHEWABLE at 21:08

## 2023-11-23 RX ADMIN — IOPAMIDOL 100 ML: 755 INJECTION, SOLUTION INTRAVENOUS at 22:39

## 2023-11-23 RX ADMIN — HYDROMORPHONE HYDROCHLORIDE 0.5 MG: 1 INJECTION, SOLUTION INTRAMUSCULAR; INTRAVENOUS; SUBCUTANEOUS at 22:52

## 2023-11-23 RX ADMIN — ONDANSETRON 4 MG: 2 INJECTION INTRAMUSCULAR; INTRAVENOUS at 21:07

## 2023-11-23 NOTE — Clinical Note
Level of Care: Telemetry [5]  Admitting Physician: NASH SAAB [[id number]]  Attending Physician: NASH SAAB [[id number]]

## 2023-11-24 NOTE — DISCHARGE INSTRUCTIONS
Stop drinking alcohol.  Drink plenty of clear fluids.     Follow-up with your primary care provider in 3-5 days.  If you do not have a primary care provider call [phone] for help in finding one, or you may follow up with [facility] at [phone].    Return to ED for any new or worsening symptoms

## 2023-11-24 NOTE — ED NOTES
Pt states he wants to leave AMA after provider spoke with him about getting admitted. Provider aware. Provided pt with AMA form, pt verbalized understanding and signed.

## 2023-11-24 NOTE — ED NOTES
This nurse asked pt if he would be able to provide a urine sample, pt states he would not be able to at this time due to decreased water intake. This nurse provided pt with urinal and cleansing wipe and asked to press call light when able to obtain sample, pt verbalized understanding. This nurse informed provider.

## 2023-11-24 NOTE — ED NOTES
Pt reports midsternal chest pain about an hour ago accompanied by upper abd pain. Pt reports both CP and abd pain has gone away. Pt states thinking he ate too much thanksgiving food earlier. Pt reports SOA earlier but states it has subsided.

## 2023-12-29 ENCOUNTER — APPOINTMENT (OUTPATIENT)
Dept: CT IMAGING | Facility: HOSPITAL | Age: 58
End: 2023-12-29
Payer: COMMERCIAL

## 2023-12-29 ENCOUNTER — HOSPITAL ENCOUNTER (EMERGENCY)
Facility: HOSPITAL | Age: 58
Discharge: HOME | End: 2023-12-29
Attending: EMERGENCY MEDICINE
Payer: COMMERCIAL

## 2023-12-29 VITALS
RESPIRATION RATE: 20 BRPM | SYSTOLIC BLOOD PRESSURE: 115 MMHG | HEIGHT: 72 IN | OXYGEN SATURATION: 95 % | TEMPERATURE: 98 F | DIASTOLIC BLOOD PRESSURE: 81 MMHG | HEART RATE: 78 BPM | BODY MASS INDEX: 27.09 KG/M2 | WEIGHT: 200 LBS

## 2023-12-29 DIAGNOSIS — M54.50 ACUTE RIGHT-SIDED LOW BACK PAIN, UNSPECIFIED WHETHER SCIATICA PRESENT: Primary | ICD-10-CM

## 2023-12-29 LAB
ALBUMIN SERPL-MCNC: 4.2 G/DL (ref 3.5–5.2)
ALBUMIN/GLOB SERPL: 1.7 G/DL
ALP SERPL-CCNC: 56 U/L (ref 39–117)
ALT SERPL W P-5'-P-CCNC: 28 U/L (ref 1–41)
ANION GAP SERPL CALCULATED.3IONS-SCNC: 12 MMOL/L (ref 5–15)
AST SERPL-CCNC: 23 U/L (ref 1–40)
BASOPHILS # BLD AUTO: 0.1 10*3/MM3 (ref 0–0.2)
BASOPHILS NFR BLD AUTO: 0.8 % (ref 0–1.5)
BILIRUB SERPL-MCNC: 0.6 MG/DL (ref 0–1.2)
BILIRUB UR QL STRIP: NEGATIVE
BUN SERPL-MCNC: 13 MG/DL (ref 6–20)
BUN/CREAT SERPL: 13.5 (ref 7–25)
CALCIUM SPEC-SCNC: 9.7 MG/DL (ref 8.6–10.5)
CHLORIDE SERPL-SCNC: 105 MMOL/L (ref 98–107)
CLARITY UR: ABNORMAL
CO2 SERPL-SCNC: 24 MMOL/L (ref 22–29)
COLOR UR: YELLOW
CREAT SERPL-MCNC: 0.96 MG/DL (ref 0.76–1.27)
DEPRECATED RDW RBC AUTO: 44.6 FL (ref 37–54)
EGFRCR SERPLBLD CKD-EPI 2021: 92.2 ML/MIN/1.73
EOSINOPHIL # BLD AUTO: 0.2 10*3/MM3 (ref 0–0.4)
EOSINOPHIL NFR BLD AUTO: 1.9 % (ref 0.3–6.2)
ERYTHROCYTE [DISTWIDTH] IN BLOOD BY AUTOMATED COUNT: 14.1 % (ref 12.3–15.4)
GLOBULIN UR ELPH-MCNC: 2.5 GM/DL
GLUCOSE SERPL-MCNC: 95 MG/DL (ref 65–99)
GLUCOSE UR STRIP-MCNC: NEGATIVE MG/DL
HCT VFR BLD AUTO: 40.1 % (ref 37.5–51)
HGB BLD-MCNC: 13.4 G/DL (ref 13–17.7)
HGB UR QL STRIP.AUTO: NEGATIVE
KETONES UR QL STRIP: NEGATIVE
LEUKOCYTE ESTERASE UR QL STRIP.AUTO: NEGATIVE
LYMPHOCYTES # BLD AUTO: 2.1 10*3/MM3 (ref 0.7–3.1)
LYMPHOCYTES NFR BLD AUTO: 22.5 % (ref 19.6–45.3)
MCH RBC QN AUTO: 28.9 PG (ref 26.6–33)
MCHC RBC AUTO-ENTMCNC: 33.4 G/DL (ref 31.5–35.7)
MCV RBC AUTO: 86.5 FL (ref 79–97)
MONOCYTES # BLD AUTO: 0.8 10*3/MM3 (ref 0.1–0.9)
MONOCYTES NFR BLD AUTO: 8.7 % (ref 5–12)
NEUTROPHILS NFR BLD AUTO: 6.2 10*3/MM3 (ref 1.7–7)
NEUTROPHILS NFR BLD AUTO: 66.1 % (ref 42.7–76)
NITRITE UR QL STRIP: NEGATIVE
NRBC BLD AUTO-RTO: 0.1 /100 WBC (ref 0–0.2)
PH UR STRIP.AUTO: 8 [PH] (ref 5–8)
PLATELET # BLD AUTO: 187 10*3/MM3 (ref 140–450)
PMV BLD AUTO: 9.5 FL (ref 6–12)
POTASSIUM SERPL-SCNC: 4.1 MMOL/L (ref 3.5–5.2)
PROT SERPL-MCNC: 6.7 G/DL (ref 6–8.5)
PROT UR QL STRIP: NEGATIVE
RBC # BLD AUTO: 4.63 10*6/MM3 (ref 4.14–5.8)
SODIUM SERPL-SCNC: 141 MMOL/L (ref 136–145)
SP GR UR STRIP: 1.02 (ref 1–1.03)
UROBILINOGEN UR QL STRIP: ABNORMAL
WBC NRBC COR # BLD AUTO: 9.3 10*3/MM3 (ref 3.4–10.8)

## 2023-12-29 PROCEDURE — 96375 TX/PRO/DX INJ NEW DRUG ADDON: CPT

## 2023-12-29 PROCEDURE — 96374 THER/PROPH/DIAG INJ IV PUSH: CPT

## 2023-12-29 PROCEDURE — 99284 EMERGENCY DEPT VISIT MOD MDM: CPT

## 2023-12-29 PROCEDURE — 25010000002 ORPHENADRINE CITRATE PER 60 MG: Performed by: EMERGENCY MEDICINE

## 2023-12-29 PROCEDURE — 25010000002 DEXAMETHASONE PER 1 MG: Performed by: NURSE PRACTITIONER

## 2023-12-29 PROCEDURE — 81003 URINALYSIS AUTO W/O SCOPE: CPT | Performed by: PHYSICIAN ASSISTANT

## 2023-12-29 PROCEDURE — 25010000002 KETOROLAC TROMETHAMINE PER 15 MG: Performed by: NURSE PRACTITIONER

## 2023-12-29 PROCEDURE — 80053 COMPREHEN METABOLIC PANEL: CPT | Performed by: PHYSICIAN ASSISTANT

## 2023-12-29 PROCEDURE — 85025 COMPLETE CBC W/AUTO DIFF WBC: CPT | Performed by: PHYSICIAN ASSISTANT

## 2023-12-29 PROCEDURE — 74176 CT ABD & PELVIS W/O CONTRAST: CPT

## 2023-12-29 RX ORDER — ORPHENADRINE CITRATE 30 MG/ML
60 INJECTION INTRAMUSCULAR; INTRAVENOUS EVERY 12 HOURS
Status: DISCONTINUED | OUTPATIENT
Start: 2023-12-29 | End: 2023-12-29

## 2023-12-29 RX ORDER — CYCLOBENZAPRINE HCL 10 MG
10 TABLET ORAL 3 TIMES DAILY PRN
Qty: 9 TABLET | Refills: 0 | Status: SHIPPED | OUTPATIENT
Start: 2023-12-29 | End: 2024-01-01

## 2023-12-29 RX ORDER — KETOROLAC TROMETHAMINE 30 MG/ML
15 INJECTION, SOLUTION INTRAMUSCULAR; INTRAVENOUS ONCE
Status: COMPLETED | OUTPATIENT
Start: 2023-12-29 | End: 2023-12-29

## 2023-12-29 RX ORDER — DEXAMETHASONE SODIUM PHOSPHATE 10 MG/ML
10 INJECTION, SOLUTION INTRAMUSCULAR; INTRAVENOUS ONCE
Status: DISCONTINUED | OUTPATIENT
Start: 2023-12-29 | End: 2023-12-29

## 2023-12-29 RX ORDER — DEXAMETHASONE SODIUM PHOSPHATE 4 MG/ML
10 INJECTION, SOLUTION INTRA-ARTICULAR; INTRALESIONAL; INTRAMUSCULAR; INTRAVENOUS; SOFT TISSUE ONCE
Status: DISCONTINUED | OUTPATIENT
Start: 2023-12-29 | End: 2023-12-29

## 2023-12-29 RX ORDER — DEXAMETHASONE SODIUM PHOSPHATE 4 MG/ML
10 INJECTION, SOLUTION INTRA-ARTICULAR; INTRALESIONAL; INTRAMUSCULAR; INTRAVENOUS; SOFT TISSUE ONCE
Status: COMPLETED | OUTPATIENT
Start: 2023-12-29 | End: 2023-12-29

## 2023-12-29 RX ORDER — LIDOCAINE 50 MG/G
1 PATCH TOPICAL EVERY 24 HOURS
Qty: 3 PATCH | Refills: 0 | Status: SHIPPED | OUTPATIENT
Start: 2023-12-29 | End: 2024-01-01

## 2023-12-29 RX ORDER — SODIUM CHLORIDE 0.9 % (FLUSH) 0.9 %
10 SYRINGE (ML) INJECTION AS NEEDED
Status: DISCONTINUED | OUTPATIENT
Start: 2023-12-29 | End: 2023-12-29 | Stop reason: HOSPADM

## 2023-12-29 RX ORDER — ORPHENADRINE CITRATE 30 MG/ML
60 INJECTION INTRAMUSCULAR; INTRAVENOUS EVERY 12 HOURS
Status: DISCONTINUED | OUTPATIENT
Start: 2023-12-29 | End: 2023-12-29 | Stop reason: HOSPADM

## 2023-12-29 RX ORDER — LIDOCAINE 50 MG/G
1 PATCH TOPICAL
Status: DISCONTINUED | OUTPATIENT
Start: 2023-12-29 | End: 2023-12-29 | Stop reason: HOSPADM

## 2023-12-29 RX ORDER — KETOROLAC TROMETHAMINE 30 MG/ML
30 INJECTION, SOLUTION INTRAMUSCULAR; INTRAVENOUS ONCE
Status: DISCONTINUED | OUTPATIENT
Start: 2023-12-29 | End: 2023-12-29

## 2023-12-29 RX ORDER — METHYLPREDNISOLONE 4 MG/1
TABLET ORAL
Qty: 21 TABLET | Refills: 0 | Status: SHIPPED | OUTPATIENT
Start: 2023-12-29

## 2023-12-29 RX ADMIN — KETOROLAC TROMETHAMINE 15 MG: 30 INJECTION, SOLUTION INTRAMUSCULAR; INTRAVENOUS at 15:38

## 2023-12-29 RX ADMIN — LIDOCAINE 1 PATCH: 50 PATCH CUTANEOUS at 15:37

## 2023-12-29 RX ADMIN — DEXAMETHASONE SODIUM PHOSPHATE 10 MG: 4 INJECTION, SOLUTION INTRAMUSCULAR; INTRAVENOUS at 15:38

## 2023-12-29 RX ADMIN — ORPHENADRINE CITRATE 60 MG: 60 INJECTION INTRAMUSCULAR; INTRAVENOUS at 15:38

## 2023-12-29 NOTE — ED NOTES
Patient evaluated by provider and will return to waiting room with Intravenous line in place.  Patient has been instructed not to inject anything into IV, or leave premises with line in place. Patient pending further evaluation, treatment, testing / monitoring.      Pt marked ready for CT.

## 2023-12-29 NOTE — DISCHARGE INSTRUCTIONS
Rest and fill and take prescriptions, as directed.  Call Dr Jones, his contact information has been provided in this discharge paperwork, for further evaluation and management of your chronic back pain.

## 2023-12-29 NOTE — ED PROVIDER NOTES
Subjective     Provider in Triage Note  Due to significant overcrowding in the emergency department patient was initially seen and evaluated in triage.  Provider in triage recommended patient placement in the treatment area to initiate therapy and movement to an ER bed as soon as possible.    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  Patient states that he was at work and picked up something which started his back pain but over the next couple days he started to have pain radiating around his right flank into the right groin.  Described as a constant throbbing sharp pain that he rates a 10/10.  He reports some dysuria this morning but no hematuria.  No diarrhea.  No nausea or vomiting.  No chest pain shortness of breath.  Denies numbness or tingling down his legs saddle anesthesia or bladder or bowel dysfunction.      History of Present Illness  58-year-old  male presents to the emergency room with complaint of right lower back pain that occurred 2 to 3 days ago, after lifting something heavy.  Patient reports some radiation down his right leg.  Patient denies dysuria or hematuria.  Patient denies numbness or tingling or sudden loss of bowel or bladder control.        Review of Systems   Constitutional:  Positive for activity change. Negative for diaphoresis, fatigue and fever.   Gastrointestinal:  Negative for abdominal pain, diarrhea, nausea and vomiting.   Genitourinary:  Negative for dysuria, hematuria and urgency.   Musculoskeletal:  Positive for back pain and myalgias.   Skin:  Negative for color change, pallor, rash and wound.   Neurological:  Negative for dizziness, tremors, syncope, weakness, light-headedness, numbness and headaches.       No past medical history on file.    Allergies   Allergen Reactions    Codeine Hives       No past surgical history on file.    No family history on file.    Social History     Socioeconomic  History    Marital status:    Tobacco Use    Smoking status: Never    Smokeless tobacco: Never   Vaping Use    Vaping Use: Never used   Substance and Sexual Activity    Alcohol use: Not Currently    Drug use: Defer    Sexual activity: Defer           Objective   Physical Exam  Constitutional:       General: He is in acute distress.      Appearance: He is not ill-appearing, toxic-appearing or diaphoretic.   HENT:      Mouth/Throat:      Mouth: Mucous membranes are moist.   Eyes:      Extraocular Movements: Extraocular movements intact.      Conjunctiva/sclera: Conjunctivae normal.      Pupils: Pupils are equal, round, and reactive to light.   Pulmonary:      Effort: Pulmonary effort is normal.   Musculoskeletal:      Lumbar back: Spasms and tenderness present. No swelling, edema, deformity, signs of trauma or bony tenderness. Decreased range of motion. Positive right straight leg raise test. Negative left straight leg raise test.        Back:    Skin:     General: Skin is warm and dry.      Findings: No erythema, lesion or rash.   Neurological:      General: No focal deficit present.      Mental Status: He is alert and oriented to person, place, and time.      Sensory: No sensory deficit.      Motor: No weakness.   Psychiatric:         Mood and Affect: Mood normal.         Behavior: Behavior normal.         Procedures           ED Course      Labs Reviewed   URINALYSIS W/ CULTURE IF INDICATED - Abnormal; Notable for the following components:       Result Value    Appearance, UA Turbid (*)     All other components within normal limits    Narrative:     In absence of clinical symptoms, the presence of pyuria, bacteria, and/or nitrites on the urinalysis result does not correlate with infection.  Urine microscopic not indicated.   CBC WITH AUTO DIFFERENTIAL - Normal   COMPREHENSIVE METABOLIC PANEL    Narrative:     GFR Normal >60  Chronic Kidney Disease <60  Kidney Failure <15     CBC AND DIFFERENTIAL     Narrative:     The following orders were created for panel order CBC & Differential.  Procedure                               Abnormality         Status                     ---------                               -----------         ------                     CBC Auto Differential[[id number]]        Normal              Final result                 Please view results for these tests on the individual orders.                                 Medications   sodium chloride 0.9 % flush 10 mL (has no administration in time range)   lidocaine (LIDODERM) 5 % 1 patch (1 patch Transdermal Medication Applied 12/29/23 1537)   orphenadrine (NORFLEX) injection 60 mg (60 mg Intravenous Given 12/29/23 1538)   ketorolac (TORADOL) injection 15 mg (15 mg Intravenous Given 12/29/23 1538)   dexAMETHasone (DECADRON) injection 10 mg (10 mg Intravenous Given 12/29/23 1538)            CT Abdomen Pelvis Stone Protocol    Result Date: 12/29/2023  Impression: 1.No acute process identified. 2.Similar moderate size sliding hiatal hernia with partial visualization of lower thoracic paraesophageal lymphadenopathy. Electronically Signed: Alfredo Lopez MD  12/29/2023 1:06 PM CST  Workstation ID: [id number]    Medical Decision Making  58-year-old  male presents to the emergency room with complaint of right lower back pain.  He states it has been going on for 2 or 3 days after lifting something heavy.  Patient states he has a history of chronic low back pain but it is worse after lifting something heavy 2 to 3 days ago.    Amount and/or Complexity of Data Reviewed  Labs: ordered.     Details: Urinalysis is negative for any red blood cells or blood.  CBC and CMP are negative for any acute abnormality.  Radiology: ordered.     Details: CT of the abdomen and pelvis is negative for acute or any acute abnormality.    Risk  Prescription drug management.  Risk Details: Discussed workup with patient and family and patient desires referral to spine  specialist.  Dr. Alhaji Jones contact information was provided in discharge paperwork.  Patient's pain was treated with 10 mg Decadron, 15 mg Toradol, 60 mg Norflex and lidocaine patch applied.  Wife is at bedside and will be  at time of DC.  Patient was discharged home to self-care with prescription for Medrol Dosepak and lidocaine patches and short course of 3 days of cyclobenzaprine.  Courage patient to return to ER if it had sudden worsening symptoms such as sudden loss of bowel or bladder control or numbness and tingling in lower extremities.  Patient and wife verbalized understanding.        Final diagnoses:   Acute right-sided low back pain, unspecified whether sciatica present       ED Disposition  ED Disposition       ED Disposition   Discharge    Condition   Stable    Comment   --               Favian Moore MD  [address]  [place] [facility]  [phone]    Schedule an appointment as soon as possible for a visit in 2 days  As needed, If symptoms worsen    Alhaji Jones MD  [address]  [place] [facility]  [phone]    Schedule an appointment as soon as possible for a visit in 2 days  As needed, If symptoms worsen and to establish healthcare.         Medication List        New Prescriptions      lidocaine 5 %  Commonly known as: LIDODERM  Place 1 patch on the skin as directed by provider Daily for 3 days. Remove & Discard patch within 12 hours or as directed by MD     methylPREDNISolone 4 MG dose pack  Commonly known as: MEDROL  Take as directed on package instructions.               Where to Get Your Medications        These medications were sent to [id number] - [place], [place] - [address] AT [facility] [address] - [phone] PH - [phone] FX  [address] [place] [address]      Phone: [phone]   cyclobenzaprine 10 MG tablet  lidocaine 5 %  methylPREDNISolone 4 MG dose pack            Héctor  Padmini ORTIZ, APRN  12/29/23 1543

## 2024-05-31 ENCOUNTER — APPOINTMENT (OUTPATIENT)
Dept: GENERAL RADIOLOGY | Facility: HOSPITAL | Age: 59
End: 2024-05-31
Payer: COMMERCIAL

## 2024-05-31 ENCOUNTER — APPOINTMENT (OUTPATIENT)
Dept: CT IMAGING | Facility: HOSPITAL | Age: 59
End: 2024-05-31
Payer: COMMERCIAL

## 2024-05-31 ENCOUNTER — HOSPITAL ENCOUNTER (OUTPATIENT)
Facility: HOSPITAL | Age: 59
Discharge: HOME | End: 2024-06-02
Attending: EMERGENCY MEDICINE | Admitting: SURGERY
Payer: COMMERCIAL

## 2024-05-31 ENCOUNTER — ON CAMPUS - OUTPATIENT (OUTPATIENT)
Dept: URBAN - METROPOLITAN AREA HOSPITAL 85 | Facility: HOSPITAL | Age: 59
End: 2024-05-31

## 2024-05-31 ENCOUNTER — APPOINTMENT (OUTPATIENT)
Dept: ULTRASOUND IMAGING | Facility: HOSPITAL | Age: 59
End: 2024-05-31
Payer: COMMERCIAL

## 2024-05-31 ENCOUNTER — ANESTHESIA (OUTPATIENT)
Dept: PERIOP | Facility: HOSPITAL | Age: 59
End: 2024-05-31
Payer: COMMERCIAL

## 2024-05-31 ENCOUNTER — ANESTHESIA EVENT (OUTPATIENT)
Dept: PERIOP | Facility: HOSPITAL | Age: 59
End: 2024-05-31
Payer: COMMERCIAL

## 2024-05-31 DIAGNOSIS — R13.10 DYSPHAGIA, UNSPECIFIED: ICD-10-CM

## 2024-05-31 DIAGNOSIS — K80.00 CALCULUS OF GALLBLADDER WITH ACUTE CHOLECYSTITIS WITHOUT OBS: ICD-10-CM

## 2024-05-31 DIAGNOSIS — K81.0 ACUTE CHOLECYSTITIS: Primary | ICD-10-CM

## 2024-05-31 DIAGNOSIS — K80.50 CHOLEDOCHOLITHIASIS: ICD-10-CM

## 2024-05-31 DIAGNOSIS — K81.9 CHOLECYSTITIS: ICD-10-CM

## 2024-05-31 DIAGNOSIS — R74.01 TRANSAMINITIS: ICD-10-CM

## 2024-05-31 LAB
ALBUMIN SERPL-MCNC: 4.4 G/DL (ref 3.5–5.2)
ALBUMIN/GLOB SERPL: 1.8 G/DL
ALP SERPL-CCNC: 72 U/L (ref 39–117)
ALT SERPL W P-5'-P-CCNC: 47 U/L (ref 1–41)
ANION GAP SERPL CALCULATED.3IONS-SCNC: 12.1 MMOL/L (ref 5–15)
APTT PPP: 25.2 SECONDS (ref 61–76.5)
AST SERPL-CCNC: 24 U/L (ref 1–40)
BASOPHILS # BLD AUTO: 0.12 10*3/MM3 (ref 0–0.2)
BASOPHILS NFR BLD AUTO: 1.1 % (ref 0–1.5)
BILIRUB SERPL-MCNC: 0.4 MG/DL (ref 0–1.2)
BUN SERPL-MCNC: 15 MG/DL (ref 6–20)
BUN/CREAT SERPL: 13.3 (ref 7–25)
CALCIUM SPEC-SCNC: 10.7 MG/DL (ref 8.6–10.5)
CHLORIDE SERPL-SCNC: 105 MMOL/L (ref 98–107)
CO2 SERPL-SCNC: 22.9 MMOL/L (ref 22–29)
CREAT SERPL-MCNC: 1.13 MG/DL (ref 0.76–1.27)
DEPRECATED RDW RBC AUTO: 42.5 FL (ref 37–54)
EGFRCR SERPLBLD CKD-EPI 2021: 75.8 ML/MIN/1.73
EOSINOPHIL # BLD AUTO: 0.09 10*3/MM3 (ref 0–0.4)
EOSINOPHIL NFR BLD AUTO: 0.8 % (ref 0.3–6.2)
ERYTHROCYTE [DISTWIDTH] IN BLOOD BY AUTOMATED COUNT: 13.9 % (ref 12.3–15.4)
ETHANOL UR QL: <0.01 %
GEN 5 2HR TROPONIN T REFLEX: <6 NG/L
GLOBULIN UR ELPH-MCNC: 2.5 GM/DL
GLUCOSE SERPL-MCNC: 117 MG/DL (ref 65–99)
HCT VFR BLD AUTO: 43.7 % (ref 37.5–51)
HGB BLD-MCNC: 14.4 G/DL (ref 13–17.7)
IMM GRANULOCYTES # BLD AUTO: 0.06 10*3/MM3 (ref 0–0.05)
IMM GRANULOCYTES NFR BLD AUTO: 0.6 % (ref 0–0.5)
INR PPP: 1 (ref 0.93–1.1)
LIPASE SERPL-CCNC: 36 U/L (ref 13–60)
LYMPHOCYTES # BLD AUTO: 2.14 10*3/MM3 (ref 0.7–3.1)
LYMPHOCYTES NFR BLD AUTO: 19.9 % (ref 19.6–45.3)
MCH RBC QN AUTO: 27.6 PG (ref 26.6–33)
MCHC RBC AUTO-ENTMCNC: 33 G/DL (ref 31.5–35.7)
MCV RBC AUTO: 83.9 FL (ref 79–97)
MONOCYTES # BLD AUTO: 0.93 10*3/MM3 (ref 0.1–0.9)
MONOCYTES NFR BLD AUTO: 8.6 % (ref 5–12)
NEUTROPHILS NFR BLD AUTO: 69 % (ref 42.7–76)
NEUTROPHILS NFR BLD AUTO: 7.44 10*3/MM3 (ref 1.7–7)
NRBC BLD AUTO-RTO: 0 /100 WBC (ref 0–0.2)
PLATELET # BLD AUTO: 240 10*3/MM3 (ref 140–450)
PMV BLD AUTO: 10.6 FL (ref 6–12)
POTASSIUM SERPL-SCNC: 4.1 MMOL/L (ref 3.5–5.2)
PROT SERPL-MCNC: 6.9 G/DL (ref 6–8.5)
PROTHROMBIN TIME: 10.9 SECONDS (ref 9.6–11.7)
QT INTERVAL: 397 MS
QTC INTERVAL: 384 MS
RBC # BLD AUTO: 5.21 10*6/MM3 (ref 4.14–5.8)
SODIUM SERPL-SCNC: 140 MMOL/L (ref 136–145)
TROPONIN T DELTA: NORMAL
TROPONIN T SERPL HS-MCNC: <6 NG/L
WBC NRBC COR # BLD AUTO: 10.78 10*3/MM3 (ref 3.4–10.8)

## 2024-05-31 PROCEDURE — 76705 ECHO EXAM OF ABDOMEN: CPT

## 2024-05-31 PROCEDURE — 99223 1ST HOSP IP/OBS HIGH 75: CPT | Performed by: NURSE PRACTITIONER

## 2024-05-31 PROCEDURE — 25010000002 HYDROMORPHONE 1 MG/ML SOLUTION: Performed by: EMERGENCY MEDICINE

## 2024-05-31 PROCEDURE — 25810000003 LACTATED RINGERS PER 1000 ML: Performed by: EMERGENCY MEDICINE

## 2024-05-31 PROCEDURE — 96361 HYDRATE IV INFUSION ADD-ON: CPT

## 2024-05-31 PROCEDURE — G0378 HOSPITAL OBSERVATION PER HR: HCPCS

## 2024-05-31 PROCEDURE — 47563 LAPARO CHOLECYSTECTOMY/GRAPH: CPT | Performed by: SURGERY

## 2024-05-31 PROCEDURE — 36415 COLL VENOUS BLD VENIPUNCTURE: CPT

## 2024-05-31 PROCEDURE — 25010000002 DEXAMETHASONE PER 1 MG: Performed by: NURSE ANESTHETIST, CERTIFIED REGISTERED

## 2024-05-31 PROCEDURE — 25010000002 PROPOFOL 200 MG/20ML EMULSION: Performed by: NURSE ANESTHETIST, CERTIFIED REGISTERED

## 2024-05-31 PROCEDURE — 99223 1ST HOSP IP/OBS HIGH 75: CPT | Performed by: SURGERY

## 2024-05-31 PROCEDURE — 96375 TX/PRO/DX INJ NEW DRUG ADDON: CPT

## 2024-05-31 PROCEDURE — C1769 GUIDE WIRE: HCPCS | Performed by: SURGERY

## 2024-05-31 PROCEDURE — 85730 THROMBOPLASTIN TIME PARTIAL: CPT | Performed by: EMERGENCY MEDICINE

## 2024-05-31 PROCEDURE — 83690 ASSAY OF LIPASE: CPT | Performed by: EMERGENCY MEDICINE

## 2024-05-31 PROCEDURE — 25010000002 ONDANSETRON PER 1 MG: Performed by: NURSE ANESTHETIST, CERTIFIED REGISTERED

## 2024-05-31 PROCEDURE — 25010000002 THIAMINE PER 100 MG: Performed by: PHYSICIAN ASSISTANT

## 2024-05-31 PROCEDURE — 88304 TISSUE EXAM BY PATHOLOGIST: CPT | Performed by: SURGERY

## 2024-05-31 PROCEDURE — 25810000003 LACTATED RINGERS PER 1000 ML: Performed by: NURSE ANESTHETIST, CERTIFIED REGISTERED

## 2024-05-31 PROCEDURE — 71045 X-RAY EXAM CHEST 1 VIEW: CPT

## 2024-05-31 PROCEDURE — 93005 ELECTROCARDIOGRAM TRACING: CPT

## 2024-05-31 PROCEDURE — 25010000002 PIPERACILLIN SOD-TAZOBACTAM PER 1 G: Performed by: EMERGENCY MEDICINE

## 2024-05-31 PROCEDURE — 0 IOHEXOL 300 MG/ML SOLUTION: Performed by: SURGERY

## 2024-05-31 PROCEDURE — 25510000001 IOPAMIDOL PER 1 ML: Performed by: EMERGENCY MEDICINE

## 2024-05-31 PROCEDURE — 25010000002 SUGAMMADEX 200 MG/2ML SOLUTION: Performed by: NURSE ANESTHETIST, CERTIFIED REGISTERED

## 2024-05-31 PROCEDURE — 25010000002 MORPHINE PER 10 MG: Performed by: EMERGENCY MEDICINE

## 2024-05-31 PROCEDURE — 25010000002 FENTANYL CITRATE (PF) 100 MCG/2ML SOLUTION: Performed by: NURSE ANESTHETIST, CERTIFIED REGISTERED

## 2024-05-31 PROCEDURE — 96365 THER/PROPH/DIAG IV INF INIT: CPT

## 2024-05-31 PROCEDURE — 47563 LAPARO CHOLECYSTECTOMY/GRAPH: CPT | Performed by: NURSE PRACTITIONER

## 2024-05-31 PROCEDURE — 25010000002 MIDAZOLAM PER 1 MG: Performed by: NURSE ANESTHETIST, CERTIFIED REGISTERED

## 2024-05-31 PROCEDURE — 74300 X-RAY BILE DUCTS/PANCREAS: CPT

## 2024-05-31 PROCEDURE — 25810000003 LACTATED RINGERS PER 1000 ML: Performed by: INTERNAL MEDICINE

## 2024-05-31 PROCEDURE — 25810000003 LACTATED RINGERS PER 1000 ML: Performed by: SURGERY

## 2024-05-31 PROCEDURE — 85610 PROTHROMBIN TIME: CPT | Performed by: EMERGENCY MEDICINE

## 2024-05-31 PROCEDURE — 25010000002 BUPIVACAINE (PF) 0.25 % SOLUTION: Performed by: SURGERY

## 2024-05-31 PROCEDURE — 82077 ASSAY SPEC XCP UR&BREATH IA: CPT | Performed by: EMERGENCY MEDICINE

## 2024-05-31 PROCEDURE — 25010000002 INDOCYANINE GREEN 25 MG RECONSTITUTED SOLUTION: Performed by: SURGERY

## 2024-05-31 PROCEDURE — 84484 ASSAY OF TROPONIN QUANT: CPT | Performed by: EMERGENCY MEDICINE

## 2024-05-31 PROCEDURE — 25010000002 HYDROMORPHONE 1 MG/ML SOLUTION: Performed by: NURSE ANESTHETIST, CERTIFIED REGISTERED

## 2024-05-31 PROCEDURE — 25010000002 PIPERACILLIN SOD-TAZOBACTAM PER 1 G: Performed by: SURGERY

## 2024-05-31 PROCEDURE — 99285 EMERGENCY DEPT VISIT HI MDM: CPT

## 2024-05-31 PROCEDURE — 80053 COMPREHEN METABOLIC PANEL: CPT | Performed by: EMERGENCY MEDICINE

## 2024-05-31 PROCEDURE — 25010000002 GLYCOPYRROLATE 1 MG/5ML SOLUTION: Performed by: NURSE ANESTHETIST, CERTIFIED REGISTERED

## 2024-05-31 PROCEDURE — 25010000002 SUCCINYLCHOLINE PER 20 MG: Performed by: NURSE ANESTHETIST, CERTIFIED REGISTERED

## 2024-05-31 PROCEDURE — 85025 COMPLETE CBC W/AUTO DIFF WBC: CPT | Performed by: EMERGENCY MEDICINE

## 2024-05-31 PROCEDURE — 25010000002 THIAMINE PER 100 MG: Performed by: INTERNAL MEDICINE

## 2024-05-31 PROCEDURE — 96376 TX/PRO/DX INJ SAME DRUG ADON: CPT

## 2024-05-31 PROCEDURE — 74177 CT ABD & PELVIS W/CONTRAST: CPT

## 2024-05-31 DEVICE — LARGE LIGATION CLIPS 6 CLIPS/CART
Type: IMPLANTABLE DEVICE | Site: ABDOMEN | Status: FUNCTIONAL
Brand: VAS-Q-CLIP

## 2024-05-31 RX ORDER — LORAZEPAM 2 MG/ML
2 INJECTION INTRAMUSCULAR
Status: DISCONTINUED | OUTPATIENT
Start: 2024-05-31 | End: 2024-06-02 | Stop reason: HOSPADM

## 2024-05-31 RX ORDER — NALOXONE HCL 0.4 MG/ML
0.1 VIAL (ML) INJECTION
Status: DISCONTINUED | OUTPATIENT
Start: 2024-05-31 | End: 2024-06-02 | Stop reason: HOSPADM

## 2024-05-31 RX ORDER — NALOXONE HCL 0.4 MG/ML
0.4 VIAL (ML) INJECTION AS NEEDED
Status: DISCONTINUED | OUTPATIENT
Start: 2024-05-31 | End: 2024-05-31 | Stop reason: HOSPADM

## 2024-05-31 RX ORDER — FLUMAZENIL 0.1 MG/ML
0.2 INJECTION INTRAVENOUS AS NEEDED
Status: DISCONTINUED | OUTPATIENT
Start: 2024-05-31 | End: 2024-05-31 | Stop reason: HOSPADM

## 2024-05-31 RX ORDER — BUPIVACAINE HYDROCHLORIDE 2.5 MG/ML
INJECTION, SOLUTION EPIDURAL; INFILTRATION; INTRACAUDAL AS NEEDED
Status: DISCONTINUED | OUTPATIENT
Start: 2024-05-31 | End: 2024-05-31 | Stop reason: HOSPADM

## 2024-05-31 RX ORDER — LORAZEPAM 1 MG/1
1 TABLET ORAL EVERY 6 HOURS
Qty: 4 TABLET | Refills: 0 | Status: COMPLETED | OUTPATIENT
Start: 2024-06-01 | End: 2024-06-02

## 2024-05-31 RX ORDER — ONDANSETRON 2 MG/ML
4 INJECTION INTRAMUSCULAR; INTRAVENOUS ONCE AS NEEDED
Status: DISCONTINUED | OUTPATIENT
Start: 2024-05-31 | End: 2024-05-31 | Stop reason: HOSPADM

## 2024-05-31 RX ORDER — ONDANSETRON 4 MG/1
4 TABLET, ORALLY DISINTEGRATING ORAL EVERY 6 HOURS PRN
Status: DISCONTINUED | OUTPATIENT
Start: 2024-05-31 | End: 2024-06-02 | Stop reason: HOSPADM

## 2024-05-31 RX ORDER — OXYCODONE HYDROCHLORIDE 5 MG/1
10 TABLET ORAL EVERY 4 HOURS PRN
Status: DISCONTINUED | OUTPATIENT
Start: 2024-05-31 | End: 2024-05-31 | Stop reason: HOSPADM

## 2024-05-31 RX ORDER — PHENYLEPHRINE HCL IN 0.9% NACL 1 MG/10 ML
SYRINGE (ML) INTRAVENOUS AS NEEDED
Status: DISCONTINUED | OUTPATIENT
Start: 2024-05-31 | End: 2024-05-31 | Stop reason: SURG

## 2024-05-31 RX ORDER — ALUMINA, MAGNESIA, AND SIMETHICONE 2400; 2400; 240 MG/30ML; MG/30ML; MG/30ML
15 SUSPENSION ORAL EVERY 6 HOURS PRN
Status: DISCONTINUED | OUTPATIENT
Start: 2024-05-31 | End: 2024-06-02 | Stop reason: HOSPADM

## 2024-05-31 RX ORDER — LORAZEPAM 1 MG/1
1 TABLET ORAL EVERY 12 HOURS SCHEDULED
Qty: 2 TABLET | Refills: 0 | Status: DISCONTINUED | OUTPATIENT
Start: 2024-06-02 | End: 2024-06-02 | Stop reason: HOSPADM

## 2024-05-31 RX ORDER — LIDOCAINE HYDROCHLORIDE 10 MG/ML
0.5 INJECTION, SOLUTION INFILTRATION; PERINEURAL ONCE AS NEEDED
Status: DISCONTINUED | OUTPATIENT
Start: 2024-05-31 | End: 2024-05-31 | Stop reason: HOSPADM

## 2024-05-31 RX ORDER — DEXMEDETOMIDINE HYDROCHLORIDE 100 UG/ML
INJECTION, SOLUTION INTRAVENOUS AS NEEDED
Status: DISCONTINUED | OUTPATIENT
Start: 2024-05-31 | End: 2024-05-31 | Stop reason: SURG

## 2024-05-31 RX ORDER — SODIUM CHLORIDE 0.9 % (FLUSH) 0.9 %
10 SYRINGE (ML) INJECTION AS NEEDED
Status: DISCONTINUED | OUTPATIENT
Start: 2024-05-31 | End: 2024-06-02 | Stop reason: HOSPADM

## 2024-05-31 RX ORDER — ONDANSETRON 2 MG/ML
4 INJECTION INTRAMUSCULAR; INTRAVENOUS EVERY 6 HOURS PRN
Status: DISCONTINUED | OUTPATIENT
Start: 2024-05-31 | End: 2024-06-02 | Stop reason: HOSPADM

## 2024-05-31 RX ORDER — SODIUM CHLORIDE 9 MG/ML
30 INJECTION, SOLUTION INTRAVENOUS CONTINUOUS PRN
Status: DISCONTINUED | OUTPATIENT
Start: 2024-05-31 | End: 2024-06-02 | Stop reason: HOSPADM

## 2024-05-31 RX ORDER — SODIUM CHLORIDE 9 MG/ML
40 INJECTION, SOLUTION INTRAVENOUS AS NEEDED
Status: DISCONTINUED | OUTPATIENT
Start: 2024-05-31 | End: 2024-06-02 | Stop reason: HOSPADM

## 2024-05-31 RX ORDER — SODIUM CHLORIDE 0.9 % (FLUSH) 0.9 %
10 SYRINGE (ML) INJECTION EVERY 12 HOURS SCHEDULED
Status: DISCONTINUED | OUTPATIENT
Start: 2024-05-31 | End: 2024-06-02 | Stop reason: HOSPADM

## 2024-05-31 RX ORDER — FAMOTIDINE 20 MG/1
20 TABLET, FILM COATED ORAL 2 TIMES DAILY
Status: DISCONTINUED | OUTPATIENT
Start: 2024-05-31 | End: 2024-05-31

## 2024-05-31 RX ORDER — LORAZEPAM 1 MG/1
1 TABLET ORAL
Status: DISCONTINUED | OUTPATIENT
Start: 2024-05-31 | End: 2024-06-02 | Stop reason: HOSPADM

## 2024-05-31 RX ORDER — PANTOPRAZOLE SODIUM 40 MG/10ML
40 INJECTION, POWDER, LYOPHILIZED, FOR SOLUTION INTRAVENOUS
Status: DISCONTINUED | OUTPATIENT
Start: 2024-05-31 | End: 2024-06-02 | Stop reason: HOSPADM

## 2024-05-31 RX ORDER — SODIUM CHLORIDE 0.9 % (FLUSH) 0.9 %
10 SYRINGE (ML) INJECTION AS NEEDED
Status: DISCONTINUED | OUTPATIENT
Start: 2024-05-31 | End: 2024-05-31 | Stop reason: HOSPADM

## 2024-05-31 RX ORDER — THIAMINE HYDROCHLORIDE 100 MG/ML
200 INJECTION, SOLUTION INTRAMUSCULAR; INTRAVENOUS EVERY 8 HOURS SCHEDULED
Status: DISCONTINUED | OUTPATIENT
Start: 2024-05-31 | End: 2024-06-02 | Stop reason: HOSPADM

## 2024-05-31 RX ORDER — LABETALOL HYDROCHLORIDE 5 MG/ML
5 INJECTION, SOLUTION INTRAVENOUS
Status: DISCONTINUED | OUTPATIENT
Start: 2024-05-31 | End: 2024-05-31 | Stop reason: HOSPADM

## 2024-05-31 RX ORDER — SODIUM CHLORIDE, SODIUM LACTATE, POTASSIUM CHLORIDE, CALCIUM CHLORIDE 600; 310; 30; 20 MG/100ML; MG/100ML; MG/100ML; MG/100ML
INJECTION, SOLUTION INTRAVENOUS CONTINUOUS PRN
Status: DISCONTINUED | OUTPATIENT
Start: 2024-05-31 | End: 2024-05-31 | Stop reason: SURG

## 2024-05-31 RX ORDER — ACETAMINOPHEN 325 MG/1
650 TABLET ORAL EVERY 4 HOURS PRN
Status: DISCONTINUED | OUTPATIENT
Start: 2024-05-31 | End: 2024-06-02 | Stop reason: HOSPADM

## 2024-05-31 RX ORDER — DIPHENHYDRAMINE HYDROCHLORIDE 50 MG/ML
12.5 INJECTION INTRAMUSCULAR; INTRAVENOUS
Status: DISCONTINUED | OUTPATIENT
Start: 2024-05-31 | End: 2024-05-31 | Stop reason: HOSPADM

## 2024-05-31 RX ORDER — SODIUM CHLORIDE, SODIUM LACTATE, POTASSIUM CHLORIDE, CALCIUM CHLORIDE 600; 310; 30; 20 MG/100ML; MG/100ML; MG/100ML; MG/100ML
150 INJECTION, SOLUTION INTRAVENOUS CONTINUOUS
Status: DISCONTINUED | OUTPATIENT
Start: 2024-05-31 | End: 2024-06-02 | Stop reason: HOSPADM

## 2024-05-31 RX ORDER — ROCURONIUM BROMIDE 10 MG/ML
INJECTION, SOLUTION INTRAVENOUS AS NEEDED
Status: DISCONTINUED | OUTPATIENT
Start: 2024-05-31 | End: 2024-05-31 | Stop reason: SURG

## 2024-05-31 RX ORDER — LORAZEPAM 2 MG/ML
1 INJECTION INTRAMUSCULAR
Status: DISCONTINUED | OUTPATIENT
Start: 2024-05-31 | End: 2024-06-02 | Stop reason: HOSPADM

## 2024-05-31 RX ORDER — ONDANSETRON 2 MG/ML
INJECTION INTRAMUSCULAR; INTRAVENOUS AS NEEDED
Status: DISCONTINUED | OUTPATIENT
Start: 2024-05-31 | End: 2024-05-31 | Stop reason: SURG

## 2024-05-31 RX ORDER — LORAZEPAM 1 MG/1
1 TABLET ORAL DAILY
Qty: 1 TABLET | Refills: 0 | Status: DISCONTINUED | OUTPATIENT
Start: 2024-06-04 | End: 2024-06-02 | Stop reason: HOSPADM

## 2024-05-31 RX ORDER — HYDRALAZINE HYDROCHLORIDE 20 MG/ML
5 INJECTION INTRAMUSCULAR; INTRAVENOUS
Status: DISCONTINUED | OUTPATIENT
Start: 2024-05-31 | End: 2024-05-31 | Stop reason: HOSPADM

## 2024-05-31 RX ORDER — SUCCINYLCHOLINE CHLORIDE 20 MG/ML
INJECTION INTRAMUSCULAR; INTRAVENOUS AS NEEDED
Status: DISCONTINUED | OUTPATIENT
Start: 2024-05-31 | End: 2024-05-31 | Stop reason: SURG

## 2024-05-31 RX ORDER — OXYCODONE HYDROCHLORIDE 5 MG/1
5 TABLET ORAL ONCE AS NEEDED
Status: COMPLETED | OUTPATIENT
Start: 2024-05-31 | End: 2024-05-31

## 2024-05-31 RX ORDER — EPHEDRINE SULFATE 5 MG/ML
5 INJECTION INTRAVENOUS ONCE AS NEEDED
Status: DISCONTINUED | OUTPATIENT
Start: 2024-05-31 | End: 2024-05-31 | Stop reason: HOSPADM

## 2024-05-31 RX ORDER — INDOCYANINE GREEN AND WATER 25 MG
5 KIT INJECTION ONCE
Status: COMPLETED | OUTPATIENT
Start: 2024-05-31 | End: 2024-05-31

## 2024-05-31 RX ORDER — HYDROCODONE BITARTRATE AND ACETAMINOPHEN 5; 325 MG/1; MG/1
1 TABLET ORAL EVERY 4 HOURS PRN
Status: DISCONTINUED | OUTPATIENT
Start: 2024-05-31 | End: 2024-06-02 | Stop reason: HOSPADM

## 2024-05-31 RX ORDER — FENTANYL CITRATE 50 UG/ML
INJECTION, SOLUTION INTRAMUSCULAR; INTRAVENOUS AS NEEDED
Status: DISCONTINUED | OUTPATIENT
Start: 2024-05-31 | End: 2024-05-31 | Stop reason: SURG

## 2024-05-31 RX ORDER — MIDAZOLAM HYDROCHLORIDE 1 MG/ML
INJECTION INTRAMUSCULAR; INTRAVENOUS AS NEEDED
Status: DISCONTINUED | OUTPATIENT
Start: 2024-05-31 | End: 2024-05-31 | Stop reason: SURG

## 2024-05-31 RX ORDER — FOLIC ACID 1 MG/1
1 TABLET ORAL DAILY
Status: DISCONTINUED | OUTPATIENT
Start: 2024-05-31 | End: 2024-06-02 | Stop reason: HOSPADM

## 2024-05-31 RX ORDER — GLYCOPYRROLATE 0.2 MG/ML
INJECTION INTRAMUSCULAR; INTRAVENOUS AS NEEDED
Status: DISCONTINUED | OUTPATIENT
Start: 2024-05-31 | End: 2024-05-31 | Stop reason: SURG

## 2024-05-31 RX ORDER — SODIUM CHLORIDE, SODIUM LACTATE, POTASSIUM CHLORIDE, CALCIUM CHLORIDE 600; 310; 30; 20 MG/100ML; MG/100ML; MG/100ML; MG/100ML
1000 INJECTION, SOLUTION INTRAVENOUS CONTINUOUS
Status: DISCONTINUED | OUTPATIENT
Start: 2024-05-31 | End: 2024-05-31

## 2024-05-31 RX ORDER — DIPHENHYDRAMINE HYDROCHLORIDE 50 MG/ML
12.5 INJECTION INTRAMUSCULAR; INTRAVENOUS ONCE AS NEEDED
Status: DISCONTINUED | OUTPATIENT
Start: 2024-05-31 | End: 2024-05-31 | Stop reason: HOSPADM

## 2024-05-31 RX ORDER — LORAZEPAM 1 MG/1
2 TABLET ORAL
Status: DISCONTINUED | OUTPATIENT
Start: 2024-05-31 | End: 2024-06-02 | Stop reason: HOSPADM

## 2024-05-31 RX ORDER — IPRATROPIUM BROMIDE AND ALBUTEROL SULFATE 2.5; .5 MG/3ML; MG/3ML
3 SOLUTION RESPIRATORY (INHALATION) ONCE AS NEEDED
Status: DISCONTINUED | OUTPATIENT
Start: 2024-05-31 | End: 2024-05-31 | Stop reason: HOSPADM

## 2024-05-31 RX ORDER — ACETAMINOPHEN 650 MG/1
650 SUPPOSITORY RECTAL EVERY 4 HOURS PRN
Status: DISCONTINUED | OUTPATIENT
Start: 2024-05-31 | End: 2024-06-02 | Stop reason: HOSPADM

## 2024-05-31 RX ORDER — PROPOFOL 10 MG/ML
INJECTION, EMULSION INTRAVENOUS AS NEEDED
Status: DISCONTINUED | OUTPATIENT
Start: 2024-05-31 | End: 2024-05-31 | Stop reason: SURG

## 2024-05-31 RX ORDER — LORAZEPAM 1 MG/1
2 TABLET ORAL EVERY 6 HOURS
Qty: 8 TABLET | Refills: 0 | Status: DISPENSED | OUTPATIENT
Start: 2024-05-31 | End: 2024-06-01

## 2024-05-31 RX ORDER — NITROGLYCERIN 0.4 MG/1
0.4 TABLET SUBLINGUAL
Status: DISCONTINUED | OUTPATIENT
Start: 2024-05-31 | End: 2024-06-02 | Stop reason: HOSPADM

## 2024-05-31 RX ORDER — DEXAMETHASONE SODIUM PHOSPHATE 4 MG/ML
INJECTION, SOLUTION INTRA-ARTICULAR; INTRALESIONAL; INTRAMUSCULAR; INTRAVENOUS; SOFT TISSUE AS NEEDED
Status: DISCONTINUED | OUTPATIENT
Start: 2024-05-31 | End: 2024-05-31 | Stop reason: SURG

## 2024-05-31 RX ORDER — ACETAMINOPHEN 325 MG/1
650 TABLET ORAL EVERY 4 HOURS PRN
Status: DISCONTINUED | OUTPATIENT
Start: 2024-05-31 | End: 2024-05-31

## 2024-05-31 RX ADMIN — HYDROCODONE BITARTRATE AND ACETAMINOPHEN 1 TABLET: 5; 325 TABLET ORAL at 20:01

## 2024-05-31 RX ADMIN — IOPAMIDOL 100 ML: 755 INJECTION, SOLUTION INTRAVENOUS at 05:58

## 2024-05-31 RX ADMIN — ONDANSETRON 4 MG: 2 INJECTION INTRAMUSCULAR; INTRAVENOUS at 09:42

## 2024-05-31 RX ADMIN — Medication 10 ML: at 20:01

## 2024-05-31 RX ADMIN — THIAMINE HYDROCHLORIDE 200 MG: 100 INJECTION, SOLUTION INTRAMUSCULAR; INTRAVENOUS at 22:20

## 2024-05-31 RX ADMIN — HYDROCODONE BITARTRATE AND ACETAMINOPHEN 1 TABLET: 5; 325 TABLET ORAL at 15:34

## 2024-05-31 RX ADMIN — HYDROMORPHONE HYDROCHLORIDE 1 MG: 1 INJECTION, SOLUTION INTRAMUSCULAR; INTRAVENOUS; SUBCUTANEOUS at 11:28

## 2024-05-31 RX ADMIN — ROCURONIUM BROMIDE 50 MG: 50 INJECTION INTRAVENOUS at 09:35

## 2024-05-31 RX ADMIN — SODIUM CHLORIDE, SODIUM LACTATE, POTASSIUM CHLORIDE, AND CALCIUM CHLORIDE: .6; .31; .03; .02 INJECTION, SOLUTION INTRAVENOUS at 09:23

## 2024-05-31 RX ADMIN — LIDOCAINE HYDROCHLORIDE 100 MG: 20 INJECTION, SOLUTION EPIDURAL; INFILTRATION; INTRACAUDAL; PERINEURAL at 09:28

## 2024-05-31 RX ADMIN — LORAZEPAM 2 MG: 1 TABLET ORAL at 16:47

## 2024-05-31 RX ADMIN — PROPOFOL 200 MG: 10 INJECTION, EMULSION INTRAVENOUS at 09:28

## 2024-05-31 RX ADMIN — GLYCOPYRROLATE 0.1 MG: 0.2 INJECTION INTRAMUSCULAR; INTRAVENOUS at 09:26

## 2024-05-31 RX ADMIN — FENTANYL CITRATE 50 MCG: 50 INJECTION, SOLUTION INTRAMUSCULAR; INTRAVENOUS at 09:50

## 2024-05-31 RX ADMIN — HYDROMORPHONE HYDROCHLORIDE 1 MG: 1 INJECTION, SOLUTION INTRAMUSCULAR; INTRAVENOUS; SUBCUTANEOUS at 11:10

## 2024-05-31 RX ADMIN — HYDROMORPHONE HYDROCHLORIDE 1 MG: 1 INJECTION, SOLUTION INTRAMUSCULAR; INTRAVENOUS; SUBCUTANEOUS at 07:15

## 2024-05-31 RX ADMIN — SUGAMMADEX 200 MG: 100 INJECTION, SOLUTION INTRAVENOUS at 10:48

## 2024-05-31 RX ADMIN — SUCCINYLCHOLINE CHLORIDE 160 MG: 20 INJECTION, SOLUTION INTRAMUSCULAR; INTRAVENOUS at 09:28

## 2024-05-31 RX ADMIN — LORAZEPAM 2 MG: 1 TABLET ORAL at 22:20

## 2024-05-31 RX ADMIN — MORPHINE SULFATE 4 MG: 4 INJECTION, SOLUTION INTRAMUSCULAR; INTRAVENOUS at 06:19

## 2024-05-31 RX ADMIN — SODIUM CHLORIDE, POTASSIUM CHLORIDE, SODIUM LACTATE AND CALCIUM CHLORIDE 150 ML/HR: 600; 310; 30; 20 INJECTION, SOLUTION INTRAVENOUS at 07:44

## 2024-05-31 RX ADMIN — HYDROMORPHONE HYDROCHLORIDE 1 MG: 1 INJECTION, SOLUTION INTRAMUSCULAR; INTRAVENOUS; SUBCUTANEOUS at 05:16

## 2024-05-31 RX ADMIN — OXYCODONE HYDROCHLORIDE 5 MG: 5 TABLET ORAL at 11:27

## 2024-05-31 RX ADMIN — Medication 100 MCG: at 09:54

## 2024-05-31 RX ADMIN — FOLIC ACID 1 MG: 1 TABLET ORAL at 16:47

## 2024-05-31 RX ADMIN — MIDAZOLAM 2 MG: 1 INJECTION INTRAMUSCULAR; INTRAVENOUS at 09:23

## 2024-05-31 RX ADMIN — PANTOPRAZOLE SODIUM 40 MG: 40 INJECTION, POWDER, FOR SOLUTION INTRAVENOUS at 16:45

## 2024-05-31 RX ADMIN — PIPERACILLIN AND TAZOBACTAM 3.38 G: 3; .375 INJECTION, POWDER, FOR SOLUTION INTRAVENOUS at 16:47

## 2024-05-31 RX ADMIN — INDOCYANINE GREEN AND WATER 5 MG: KIT at 09:20

## 2024-05-31 RX ADMIN — THIAMINE HYDROCHLORIDE 200 MG: 100 INJECTION, SOLUTION INTRAMUSCULAR; INTRAVENOUS at 16:46

## 2024-05-31 RX ADMIN — Medication 100 MCG: at 09:59

## 2024-05-31 RX ADMIN — DEXMEDETOMIDINE HYDROCHLORIDE 4 MCG: 100 INJECTION, SOLUTION INTRAVENOUS at 10:06

## 2024-05-31 RX ADMIN — FENTANYL CITRATE 50 MCG: 50 INJECTION, SOLUTION INTRAMUSCULAR; INTRAVENOUS at 09:28

## 2024-05-31 RX ADMIN — PIPERACILLIN AND TAZOBACTAM 3.38 G: 3; .375 INJECTION, POWDER, FOR SOLUTION INTRAVENOUS at 07:15

## 2024-05-31 RX ADMIN — SODIUM CHLORIDE, POTASSIUM CHLORIDE, SODIUM LACTATE AND CALCIUM CHLORIDE 150 ML/HR: 600; 310; 30; 20 INJECTION, SOLUTION INTRAVENOUS at 15:34

## 2024-05-31 RX ADMIN — DEXAMETHASONE SODIUM PHOSPHATE 4 MG: 4 INJECTION, SOLUTION INTRAMUSCULAR; INTRAVENOUS at 09:42

## 2024-05-31 NOTE — H&P
[facility] H&P    Patient Name: Virgilio Swain  : 1965  MRN: [id number]  Primary Care Physician: Favian Moore MD  Date of admission: 2024     Patient Care Team:  Favian Moore MD as PCP - General (Family Medicine)          Subjective   History Present Illness     Chief Complaint:   Chief Complaint   Patient presents with    Chest Pain     Epigastric pain    Chest Pain   Associated symptoms include abdominal pain, nausea and vomiting.       ED  58-year-old male with a history of pancreatitis diagnosed in 2023 presents with suspected recurrence with severe epigastric pain with nausea and vomiting onset at midnight. Patient states he has chest pain but then points to his epigastrium. Patient admits to alcohol abuse yesterday. Patient was given aspirin, Zofran, fentanyl and route per EMS.     Observation 24  General surgery and GI consulted. Pt having cholecystectomy today.       Review of Systems   Cardiovascular:  Positive for chest pain.   Gastrointestinal:  Positive for abdominal pain, nausea and vomiting.             Personal History     Past Medical History: History reviewed. No pertinent past medical history.    Surgical History:    History reviewed. No pertinent surgical history.        Family History: family history is not on file. Otherwise pertinent FHx was reviewed and unremarkable.     Social History:  reports that he has never smoked. He has never used smokeless tobacco. He reports that he does not currently use alcohol. Drug use questions deferred to the physician.      Medications:  Prior to Admission medications    Medication Sig Start Date End Date Taking? Authorizing Provider   methylPREDNISolone (MEDROL) 4 MG dose pack Take as directed on package instructions. 23  Padmini Anaya APRN   naproxen (EC NAPROSYN) 500 MG EC tablet Take 1 tablet by mouth 2 (Two) Times a Day As Needed (pain). 21  Nighat Melissa APRN       Allergies:   No Known Allergies    Objective   Objective     Vital Signs  Temp:  [97.7 °F (36.5 °C)-98.3 °F (36.8 °C)] 97.7 °F (36.5 °C)  Heart Rate:  [55-85] 66  Resp:  [13-25] 15  BP: (120-174)/() 134/81  SpO2:  [94 %-100 %] 94 %  on  Flow (L/min):  [6] 6;   Device (Oxygen Therapy): room air  Body mass index is 28.48 kg/m².    Physical Exam  Constitutional:       Appearance: Normal appearance.   Cardiovascular:      Rate and Rhythm: Normal rate and regular rhythm.      Pulses: Normal pulses.      Heart sounds: Normal heart sounds.   Pulmonary:      Effort: Pulmonary effort is normal.      Breath sounds: Normal breath sounds.   Neurological:      General: No focal deficit present.      Mental Status: He is alert and oriented to person, place, and time.   Psychiatric:         Mood and Affect: Mood normal.         Behavior: Behavior normal.       Results Review:  I have personally reviewed most recent cardiac tracings, lab results, and radiology images and interpretations and agree with findings, most notably: cbc, cmp, ethanol, lipase, troponin, inr, us abd, ct abd, chest xray, ekg.    Results from last 7 days   Lab Units 05/31/24  0512   WBC 10*3/mm3 10.78   HEMOGLOBIN g/dL 14.4   HEMATOCRIT % 43.7   PLATELETS 10*3/mm3 240   INR  1.00     Results from last 7 days   Lab Units 05/31/24  0714 05/31/24  0512   SODIUM mmol/L  --  140   POTASSIUM mmol/L  --  4.1   CHLORIDE mmol/L  --  105   CO2 mmol/L  --  22.9   BUN mg/dL  --  15   CREATININE mg/dL  --  1.13   GLUCOSE mg/dL  --  117*   CALCIUM mg/dL  --  10.7*   ALK PHOS U/L  --  72   ALT (SGPT) U/L  --  47*   AST (SGOT) U/L  --  24   HSTROP T ng/L <6 <6     Estimated Creatinine Clearance: 86.4 mL/min (by C-G formula based on SCr of 1.13 mg/dL).  Brief Urine Lab Results  (Last result in the past 365 days)        Color   Clarity   Blood   Leuk Est   Nitrite   Protein   CREAT   Urine HCG        12/29/23 1321 Yellow   Turbid   Negative   Negative   Negative   Negative                    Microbiology Results (last 10 days)       ** No results found for the last 240 hours. **            ECG/EMG Results (most recent)       Procedure Component Value Units Date/Time    ECG 12 Lead Chest Pain [[id number]] Collected: 05/31/24 0503     Updated: 05/31/24 1057     QT Interval 397 ms      QTC Interval 384 ms     Narrative:      HEART RATE= 56  bpm  RR Interval= 1068  ms  CA Interval= 133  ms  P Horizontal Axis= 24  deg  P Front Axis= 22  deg  QRSD Interval= 89  ms  QT Interval= 397  ms  QTcB= 384  ms  QRS Axis= 17  deg  T Wave Axis= 31  deg  - ABNORMAL ECG -  Atrial-paced complexes  When compared with ECG of 23-Nov-2023 20:55:25,  Significant change in rhythm  Electronically Signed By: Subhash Macias (Channing) 31-May-2024 10:56:55  Date and Time of Study: 2024-05-31 05:03:48    Telemetry Scan [[id number]] Resulted: 05/31/24     Updated: 05/31/24 1131                    FL Cholangiogram Operative    Result Date: 5/31/2024  Impression: Intraoperative cholangiogram with fluoroscopy. Please refer to the procedure report for findings and recommendations Electronically Signed: Radha Jones MD  5/31/2024 12:00 PM EDT  Workstation ID: [id number]    US Abdomen Limited    Result Date: 5/31/2024  Impression: Findings are concerning for acute calculus cholecystitis. Electronically Signed: Idris Peterson MD  5/31/2024 6:55 AM EDT  Workstation ID: [id number]    CT Abdomen Pelvis With Contrast    Result Date: 5/31/2024  Impression: Mild distention of the gallbladder and mild biliary dilatation. Questionable gallbladder wall thickening. Correlate for symptoms of acute cholecystitis and consider ultrasound evaluation. Electronically Signed: Idris Peterson MD  5/31/2024 6:12 AM EDT  Workstation ID: [id number]    XR Chest 1 View    Result Date: 5/31/2024  Impression: No acute cardiopulmonary abnormality. Electronically Signed: Idris Peterson MD  5/31/2024 5:27 AM EDT  Workstation ID: [id number]       Estimated Creatinine Clearance:  86.4 mL/min (by C-G formula based on SCr of 1.13 mg/dL).    Assessment & Plan   Assessment/Plan       Active Hospital Problems    Diagnosis  POA    **Cholecystitis [K81.9]  Yes    Choledocholithiasis [K80.50]  Unknown      Resolved Hospital Problems   No resolved problems to display.       Cholecystitis/choledocholithiasis  - cbc, cmp, lipase, troponin, inr unremarkable  - chest xray is unremarkable  - ct abd showing mild distention of the gallbladder and mild biliary dilatation. Questionable gallbladder wall thickening.   - us abd reviewed and showing acute calculus cholecystitis  - EKG rate 56 pacs  - general surgery consulted  - cholecystectomy performed  - Gastroenterology consulted for choledocholithiasis  - pt scheduled for ERCP    Etoh abuse  - pt is frequent drinker  - last drink was yesterday  - ethanol <.01      VTE Prophylaxis -   Mechanical Order History:        Ordered        05/31/24 1247  Maintain Sequential Compression Device  Continuous            05/31/24 1100  Place Sequential Compression Device  Once                          Pharmalogical Order History:       None            CODE STATUS:    Code Status and Medical Interventions:   Ordered at: 05/31/24 1247     Code Status (Patient has no pulse and is not breathing):    CPR (Attempt to Resuscitate)     Medical Interventions (Patient has pulse or is breathing):    Full Support       This patient has been examined wearing personal protective equipment.     I discussed the patient's findings and my recommendations with patient and nursing staff.      Signature:Electronically signed by Susan Anaya PA-C, 05/31/24, 2:54 PM EDT.

## 2024-05-31 NOTE — ED NOTES
Report given to Esmer in pre-op, pt states he last ate or drank anything at 1930 last night. Consent signed and pt undressed. Consent sent with pt.

## 2024-05-31 NOTE — PLAN OF CARE
Goal Outcome Evaluation:  Plan of Care Reviewed With: patient        Progress: improving       Pt admitted to floor post lapcholey w/ no complications. 4 abd incisions clean, dry , open to air, and approximated. Pt to have ercp 6/1, consent signed. IVF infusing per order. Pt has c/o abd    pain once since being admitted to the floor. PRN Norco 5mg/325 administered, pt states medication is effective. CIWA scores have been 0, pt in seizure cautions per protocol. CIWA medication regimen explained and administered to pt. Family at bedside visiting.     Problem: Adult Inpatient Plan of Care  Goal: Plan of Care Review  Outcome: Ongoing, Progressing  Flowsheets (Taken 5/31/2024 1802)  Progress: improving  Plan of Care Reviewed With: patient  Goal: Patient-Specific Goal (Individualized)  Outcome: Ongoing, Progressing  Goal: Absence of Hospital-Acquired Illness or Injury  Outcome: Ongoing, Progressing  Intervention: Identify and Manage Fall Risk  Recent Flowsheet Documentation  Taken 5/31/2024 1800 by Shirley Kerns RN  Safety Promotion/Fall Prevention: safety round/check completed  Taken 5/31/2024 1600 by Shirley Kerns RN  Safety Promotion/Fall Prevention: safety round/check completed  Taken 5/31/2024 1400 by Shirley Kerns RN  Safety Promotion/Fall Prevention: safety round/check completed  Taken 5/31/2024 1246 by Shirley Kerns RN  Safety Promotion/Fall Prevention: safety round/check completed  Intervention: Prevent Skin Injury  Recent Flowsheet Documentation  Taken 5/31/2024 1400 by Shirley Kerns RN  Skin Protection: adhesive use limited  Goal: Optimal Comfort and Wellbeing  Outcome: Ongoing, Progressing  Intervention: Provide Person-Centered Care  Recent Flowsheet Documentation  Taken 5/31/2024 1400 by Shirley Kerns RN  Trust Relationship/Rapport:   care explained   choices provided   questions answered   questions encouraged  Goal: Readiness for Transition of Care  Outcome: Ongoing,  Progressing  Intervention: Mutually Develop Transition Plan  Recent Flowsheet Documentation  Taken 5/31/2024 1255 by Shirley Kerns RN  Equipment Currently Used at Home: none  Transportation Anticipated: car, drives self  Patient/Family Anticipated Services at Transition: none  Patient/Family Anticipates Transition to: home     Problem: Hypertension Comorbidity  Goal: Blood Pressure in Desired Range  Outcome: Ongoing, Progressing  Intervention: Maintain Blood Pressure Management  Recent Flowsheet Documentation  Taken 5/31/2024 1400 by Shirley Kerns RN  Medication Review/Management: medications reviewed     Problem: Pain Chronic (Persistent) (Comorbidity Management)  Goal: Acceptable Pain Control and Functional Ability  Outcome: Ongoing, Progressing  Intervention: Manage Persistent Pain  Recent Flowsheet Documentation  Taken 5/31/2024 1400 by Shirley Kerns RN  Sleep/Rest Enhancement: noise level reduced  Medication Review/Management: medications reviewed  Intervention: Optimize Psychosocial Wellbeing  Recent Flowsheet Documentation  Taken 5/31/2024 1400 by Shirley Kerns RN  Supportive Measures: active listening utilized

## 2024-05-31 NOTE — ANESTHESIA PREPROCEDURE EVALUATION
Anesthesia Evaluation     Patient summary reviewed and Nursing notes reviewed   NPO Solid Status: > 8 hours  NPO Liquid Status: > 8 hours           Airway   Mallampati: II  TM distance: >3 FB  Neck ROM: full  No difficulty expected  Dental - normal exam         Pulmonary - normal exam    breath sounds clear to auscultation  (+) a smoker Current, Abstained day of surgery, cigarettes,  Cardiovascular - normal exam  Exercise tolerance: unable to assess    ECG reviewed  Rhythm: regular  Rate: normal        Neuro/Psych- negative ROS  GI/Hepatic/Renal/Endo - negative ROS     Musculoskeletal (-) negative ROS    Abdominal  - normal exam   Substance History - negative use     OB/GYN negative ob/gyn ROS         Other - negative ROS                   Anesthesia Plan    ASA 2 - emergent     general     (Has significant abdominal pain, RSI  Caps on upper incisors)  intravenous induction     Anesthetic plan, risks, benefits, and alternatives have been provided, discussed and informed consent has been obtained with: patient.    CODE STATUS:

## 2024-05-31 NOTE — ANESTHESIA PROCEDURE NOTES
Airway  Urgency: elective    Date/Time: 5/31/2024 9:29 AM  Airway not difficult    General Information and Staff    Patient location during procedure: OR  CRNA/CAA: Rosalina Felix CRNA    Indications and Patient Condition  Indications for airway management: airway protection    Preoxygenated: yes  Mask difficulty assessment: 0 - not attempted    Final Airway Details  Final airway type: endotracheal airway      Successful airway: ETT  Cuffed: yes   Successful intubation technique: video laryngoscopy  Facilitating devices/methods: cricoid pressure and intubating stylet  Endotracheal tube insertion site: oral  Blade: Franklin  Blade size: 3  ETT size (mm): 7.5  Cormack-Lehane Classification: grade I - full view of glottis  Placement verified by: chest auscultation and capnometry   Measured from: teeth  Number of attempts at approach: 1  Assessment: lips, teeth, and gum same as pre-op and atraumatic intubation

## 2024-05-31 NOTE — OP NOTE
Operative Note    Virgilio Swain  5/31/2024    Pre-op Diagnosis:   Cholecystitis    Post-op Diagnosis:     Cholecystitis, choledocholithiasis    Procedure/CPT® Codes:      Procedure(s):  CHOLECYSTECTOMY LAPAROSCOPIC INTRAOPERATIVE CHOLANGIOGRAM WITH DAVINCI ROBOT    Surgeon(s):  José Holloway MD    Anesthesia: General    Staff:   Circulator: Sigifredo Kaur RN; Shannon Aponte RN  Radiology Technologist: Lizbeth Aguero  Scrub Person: Denia De Leon  Assistant: Denia Aguiar APRN    Estimated Blood Loss: minimal    Specimens:                ID Type Source Tests Collected by Time   A :  Tissue Gallbladder TISSUE PATHOLOGY EXAM José Holloway MD 5/31/2024 0959         Drains:   [REMOVED] NG/OG Tube Orogastric 16 Fr Right mouth (Removed)       Findings: cholecystitis, choledocholithiasis on IOC    Complications: none    Indication: Cholecystitis, right upper abdominal pain    Operative Note:    The patient was seen and consent was obtained preoperatively.  Findings he was brought to the operating room and placed in supine position on the OR table.  General anesthetic was administered and the patient was orotracheally intubated without incident.  A briefing was performed.  The abdomen was prepped and draped in normal sterile fashion.  A timeout was then performed.    Following timeout local anesthetic was injected in the left upper quadrant of the abdomen where a small skin incision was made to allow optical entry using an 8 mm robotic trocar.  The abdomen was entered without difficulty and insufflated without issue.  The area below trocar insertion was found to be without injury.  Additional ports were then inserted under direct visualization and after injection of local anesthetic.  A 12 mm port was placed in the left rectus periumbilical position followed by an 8 mm port in the right rectus periumbilical position and a final 8 mm port in the right lower quadrant.  The patient was positioned  in reverse Trendelenburg slight right side up position to allow visualization of the right upper quadrant.  The robot was docked and instrumentation was inserted.    The fundus of the gallbladder was identified.  It appeared to be edematous, distended and inflamed.  The fundus was grasped and elevated cephalad direction.  Inflammatory adhesions of the omentum to the gallbladder body and neck were then taken down using hook electrocautery.  The infundibulum the gallbladder was identified.  It was extracted out away from the liver.  The peritoneum at the medial and lateral aspect of the gallbladder neck was then incised with hook electrocautery set the gallbladder to be retracted further out away from the liver.  There appeared to be a significant amount of edema surrounding the gallbladder consistent with cholecystitis.  Further dissection was then undertaken to elevate the neck of the gallbladder completely up and away from the gallbladder fossa.  A critical view of safety was obtained showing a single duct emanating from the gallbladder and a single artery going to the gallbladder.  The artery was cauterized with bipolar cautery and divided with monopolar cautery.  A large clip was then placed across the base of the gallbladder.  A ductotomy was then created using scissors.  A single stone was milked out of the cystic duct.  A cholangiogram catheter was then inserted into the cystic duct through a separate small puncture in the right upper quadrant.  A cholangiogram was then performed that appeared to show multiple stones at the distal common bile duct with good filling of the intra and extrahepatic biliary tree which appeared to be somewhat dilated.  The cholangiogram catheter was then removed.  2 clips were then placed on the downside of the duct which was then divided completely using cautery.  The gallbladder was then completely freed from its fossa using electrocautery.  It was placed into a bag and retrieved  through the 12 mm port site.  The fascial defect was then closed using a 0 Vicryl on a suture passer the remaining instruments were then removed.  The abdomen was desufflated.  The ports were then removed.  The incisions were then closed with 4-0 Vicryl and covered with skin glue.  Gastroenterology will be consulted for ERCP due to the apparent choledocholithiasis.    Assistant: Denia Aguiar APRN was responsible for performing the following activities:  Insertion and exchange of robotic instrumentation, retrieval of gallbladder and bag, assistance with fascial closure, assistance with cholangiogram, closure of skin, placement of dressings  and their skilled assistance was necessary for the success of this case.          This document has been electronically signed by José Holloway MD on May 31, 2024 10:58 EDT      José Holloway MD     Date: 5/31/2024  Time: 10:46 EDT

## 2024-05-31 NOTE — CONSULTS
GI CONSULT  NOTE:    Referring Provider:  Dr. Holloway    Chief complaint: Abdominal pain, choledocholithiasis    Subjective .     History of present illness: Virgilio Swain is a 58 y.o. male with no significant previous medical history who presents with complaints of epigastric/lower chest pain.  The patient reports that he had an episode of similar pain in 11/2023 and states that he was told that he had pancreatitis.  His pain resolved but then began intermittently approximately 2 weeks ago.  Last evening, the pain became constant which prompted him to present to the ER.  The pain was sharp in nature.  He did have associated nausea, but denied any vomiting.  Does report recent heartburn for which he has been taking Tums as needed.  Also complains of intermittent dysphagia to solids.  Denies any NSAID use.  Reports regular bowel movements without bright red blood per rectum or melena.  Denies any recent acholic stools.  No recent fever.  Does report some mild unintentional weight loss recently.      Endo History:  No previous endoscopy.  Patient reports negative Cologuard test last week.    Past Medical History:  History reviewed. No pertinent past medical history.    Past Surgical History:  History reviewed. No pertinent surgical history.    Social History:  Social History     Tobacco Use    Smoking status: Never    Smokeless tobacco: Never   Vaping Use    Vaping status: Never Used   Substance Use Topics    Alcohol use: Not Currently    Drug use: Defer       Family History:  History reviewed. No pertinent family history.    Medications:  No medications prior to admission.       Scheduled Meds:famotidine, 20 mg, Oral, BID  piperacillin-tazobactam, 3.375 g, Intravenous, Q8H      Continuous Infusions:lactated ringers, 150 mL/hr, Last Rate: 150 mL/hr (05/31/24 0744)      PRN Meds:.  acetaminophen **OR** acetaminophen    HYDROcodone-acetaminophen    HYDROmorphone **AND** naloxone    ondansetron ODT **OR**  ondansetron    ALLERGIES:  Patient has no known allergies.    ROS:  The following systems were reviewed;   Constitution:  No fevers, no chills, unintentional weight loss  Skin: no rash, no jaundice  Eyes:  No blurry vision, no eye pain  HENT:  No change in hearing or smell  Resp:  No dyspnea or cough  CV:  No chest pain or palpitations  :  No dysuria, hematuria  Musculoskeletal:  No leg cramps or arthralgias  Neuro:  No tremor, no numbness  Psych:  No depression or confusion    Objective     Vital Signs:   Vitals:    05/31/24 1127 05/31/24 1142 05/31/24 1157 05/31/24 1250   BP: 144/88 147/85 136/90 134/81   BP Location:    Right arm   Patient Position:    Lying   Pulse: 64 76 66    Resp: 13 13 13 15   Temp:   97.9 °F (36.6 °C) 97.7 °F (36.5 °C)   TempSrc:   Temporal Oral   SpO2: 98% 96% 94%    Weight:       Height:           Physical Exam:       General Appearance:    Awake and alert, in no acute distress   Head:    Normocephalic, without obvious abnormality, atraumatic   Throat:   No oral lesions, no thrush, oral mucosa moist   Lungs:     Respirations regular, even and unlabored   Chest Wall:    No abnormalities observed   Abdomen:     Soft, non-tender, no rebound or guarding, non-distended   Rectal:     Deferred   Extremities:   Moves all extremities, no edema, no cyanosis   Pulses:   Pulses palpable and equal bilaterally   Skin:   No rash, no jaundice, normal palpation   Lymph nodes:   No cervical, supraclavicular or submandibular palpable adenopathy   Neurologic:   Cranial nerves 2 - 12 grossly intact, no asterixis       Results Review:   I reviewed the patient's labs and imaging.  CBC    Results from last 7 days   Lab Units 05/31/24  0512   WBC 10*3/mm3 10.78   HEMOGLOBIN g/dL 14.4   PLATELETS 10*3/mm3 240     CMP   Results from last 7 days   Lab Units 05/31/24  0512   SODIUM mmol/L 140   POTASSIUM mmol/L 4.1   CHLORIDE mmol/L 105   CO2 mmol/L 22.9   BUN mg/dL 15   CREATININE mg/dL 1.13   GLUCOSE mg/dL 117*  "  ALBUMIN g/dL 4.4   BILIRUBIN mg/dL 0.4   ALK PHOS U/L 72   AST (SGOT) U/L 24   ALT (SGPT) U/L 47*   LIPASE U/L 36     Cr Clearance Estimated Creatinine Clearance: 86.4 mL/min (by C-G formula based on SCr of 1.13 mg/dL).  Coag   Results from last 7 days   Lab Units 05/31/24  0512   INR  1.00   APTT seconds 25.2*     HbA1C No results found for: \"HGBA1C\"  Blood Glucose No results found for: \"POCGLU\"  Infection     UA      Imaging Results (Last 72 Hours)       Procedure Component Value Units Date/Time    FL Cholangiogram Operative [[id number]] Collected: 05/31/24 1158     Updated: 05/31/24 1202    Narrative:      FL CHOLANGIOGRAM OPERATIVE    Date of Exam: 5/31/2024 10:05 AM EDT    Indication: CHOLECYSTECTOMY LAPAROSCOPIC INTRAOPERATIVE CHOLANGIOGRAM WITH DAVINCI ROBOT.    Comparison: Right upper quadrant abdominal ultrasound 5/31/2024, CT abdomen pelvis 5/31/2024    Technique:  Digital spot images were obtained from an intraoperative cholangiogram procedure performed by the surgeon.    Fluoroscopic Time: 0.2 minutes    Number of Images: 1 spot fluoroscopic series image    Findings:  Fluoroscopy was utilized during intraoperative cholangiogram. Contrast was injected retrograde into the cystic duct. There is extravasation of contrast along the free margin of the right hepatic lobe. There is good contrast opacification of the   downstream Intermatic bile ducts and the common bile duct. There small rounded filling defects within the CBD near the level of the ampulla suggestive of choledocholithiasis. Common bile duct appears mildly dilated.      Impression:      Impression:  Intraoperative cholangiogram with fluoroscopy. Please refer to the procedure report for findings and recommendations      Electronically Signed: Radha Jones MD    5/31/2024 12:00 PM EDT    Workstation ID: [id number]    US Abdomen Limited [[id number]] Collected: 05/31/24 0654     Updated: 05/31/24 0658    Narrative:      US ABDOMEN LIMITED    Date of " Exam: 5/31/2024 6:22 AM EDT    Indication: epigastric pain.    Comparison: CT abdomen and pelvis 5/31/2024.    Technique: Grayscale and color Doppler ultrasound evaluation of the right upper quadrant was performed.      Findings:  Liver appears normal in size and echogenicity. There is no focal liver abnormality.  The portal vein and hepatic veins demonstrate normal directional flow and exhibit normal waveform on spectral imaging.    The gallbladder is distended measuring up to 13.2 cm. There is cholelithiasis. There is gallbladder wall thickening. No para cholecystic fluid. The common bile duct measures 6 mm diameter.    The right kidney measures 9.7 x 5.6 x 5.7 cm. Kidney echogenicity and vascularity appear within normal limits.  There is no solid kidney mass, echogenic shadowing stone or hydronephrosis.    Limited visualization of the pancreas and aorta is unremarkable.    There is no significant ascites within the right upper quadrant.      Impression:      Impression:  Findings are concerning for acute calculus cholecystitis.        Electronically Signed: Idris Peterson MD    5/31/2024 6:55 AM EDT    Workstation ID: [id number]    CT Abdomen Pelvis With Contrast [[id number]] Collected: 05/31/24 0608     Updated: 05/31/24 0615    Narrative:      CT ABDOMEN PELVIS W CONTRAST    Date of Exam: 5/31/2024 5:51 AM EDT    Indication: severe epigastric pain, hx pancreatitis.    Comparison: 12/29/2023.    Technique: Axial CT images were obtained of the abdomen and pelvis following the uneventful intravenous administration of iodinated contrast. Sagittal and coronal reconstructions were performed.  Automated exposure control and iterative reconstruction   methods were used.        Findings:  Lung bases are clear. There is a large stomach containing hiatal hernia. Heart is normal size. No acute findings in the superficial soft tissues. No acute osseous abnormality or destructive bone lesion. There is mild thoracolumbar  spondylosis.    The liver appears homogeneous. The gallbladder is mildly distended and there may be some gallbladder wall thickening. Common bile duct is mildly dilated measuring up to 9 mm. Mild intrahepatic biliary dilatation. The pancreas, distal stomach, spleen and   adrenal glands appear within normal limits. Kidneys appear normal. No urolithiasis or hydronephrosis. Prostate gland, urinary bladder and appendix appear normal. Colon is unremarkable. No small bowel distention. Abdominal and pelvic vasculature appear   within normal limits. There is no ascites, pneumoperitoneum or lymphadenopathy.      Impression:      Impression:  Mild distention of the gallbladder and mild biliary dilatation. Questionable gallbladder wall thickening. Correlate for symptoms of acute cholecystitis and consider ultrasound evaluation.            Electronically Signed: Idris Peterson MD    5/31/2024 6:12 AM EDT    Workstation ID: [id number]    XR Chest 1 View [[id number]] Collected: 05/31/24 0527     Updated: 05/31/24 0529    Narrative:      XR CHEST 1 VW    Date of Exam: 5/31/2024 5:12 AM EDT    Indication: cp    Comparison: 11/23/2023.    Findings:  There is no pneumothorax, pleural effusion or focal airspace consolidation. Heart size and pulmonary vasculature appear within normal limits. Regional bones appear intact.      Impression:      Impression:  No acute cardiopulmonary abnormality.      Electronically Signed: Idris Peterson MD    5/31/2024 5:27 AM EDT    Workstation ID: [id number]            ASSESSMENT:  -Choledocholithiasis -evidenced on IOC  -Epigastric pain  -Nausea  -Acute calculus cholecystitis s/p cholecystectomy with IOC today  -Dyspepsia  -Dysphagia  -Mildly elevated LFTs    PLAN:  Patient is a 58-year-old male with no significant previous medical history who presented on 5/31 with complaints of chest/epigastric pain.  CT abdomen/pelvis W showed mild distention of the gallbladder and mild biliary dilation with  questionable gallbladder wall thickening.  Abdominal US shows findings concerning for acute calculus cholecystitis.  The patient was taken for cholecystectomy with IOC today.  Intraoperative cholangiogram did show choledocholithiasis.    Total bilirubin 0.4, alk phos 72, AST 24, ALT 47.  Lipase normal.  WBC count normal.  Will proceed with ERCP today.  Maintain NPO.  Antiemetics/analgesics as needed.  Start PPI.  Agree with antibiotics.  Likely home tomorrow from GI standpoint.      I discussed the patients findings and my recommendations with the patient.  I will discuss case with Dr. Bazan and change plan accordingly.    We appreciate the referral.    Electronically signed by BRITTNEY Caballero, 05/31/24, 1:17 PM EDT.

## 2024-05-31 NOTE — H&P
"CHIEF COMPLAINT:    Epigastric and ruq pain    HISTORY OF PRESENT ILLNESS:    Virgilio Swain is a 58 y.o. male who presented to the emergency department today with complaints of acute onset of epigastric pain that migrated down to his right upper quadrant abdomen.  The pain began abruptly early this morning around midnight and awakened him.  The pain was sharp, severe and constant which prompted him to come to the emergency department.  He states that last Friday he had similar but less severe and self-limited pain.  He notes that last year around Thanksgiving time he also had similar symptoms and was apparently told that he had pancreatitis at that time.  He has never had prior abdominal surgery.  He has not eaten anything since 8 PM yesterday.  He denies any blood thinning medications.    No past medical history on file.    No past surgical history on file.    Prior to Admission medications    Medication Sig Start Date End Date Taking? Authorizing Provider   methylPREDNISolone (MEDROL) 4 MG dose pack Take as directed on package instructions. 12/29/23   Padmini Anaya APRN   naproxen (EC NAPROSYN) 500 MG EC tablet Take 1 tablet by mouth 2 (Two) Times a Day As Needed (pain). 4/17/21   Nighat Melissa APRN       No Known Allergies    No family history on file.    Social History     Socioeconomic History    Marital status:    Tobacco Use    Smoking status: Never    Smokeless tobacco: Never   Vaping Use    Vaping status: Never Used   Substance and Sexual Activity    Alcohol use: Not Currently    Drug use: Defer    Sexual activity: Defer       Review of Systems   Gastrointestinal:  Positive for abdominal pain.       Objective     /71   Pulse 60   Temp 98.3 °F (36.8 °C) (Oral)   Resp 19   Ht 182.9 cm (72\")   Wt 95.3 kg (210 lb)   SpO2 94%   BMI 28.48 kg/m²     Physical Exam  Constitutional:       General: He is not in acute distress.     Appearance: Normal appearance. He is not ill-appearing, " toxic-appearing or diaphoretic.   HENT:      Head: Normocephalic and atraumatic.   Eyes:      General:         Right eye: No discharge.         Left eye: No discharge.      Extraocular Movements: Extraocular movements intact.      Conjunctiva/sclera: Conjunctivae normal.   Pulmonary:      Effort: Pulmonary effort is normal. No respiratory distress.   Abdominal:      Palpations: Abdomen is soft. There is no mass.      Tenderness: There is abdominal tenderness in the right upper quadrant and epigastric area. There is no guarding or rebound.      Hernia: No hernia is present.   Skin:     General: Skin is warm.      Coloration: Skin is not jaundiced.   Neurological:      General: No focal deficit present.      Mental Status: He is alert and oriented to person, place, and time.   Psychiatric:         Mood and Affect: Mood normal.         Behavior: Behavior normal.         Thought Content: Thought content normal.         Judgment: Judgment normal.         DIAGNOSTIC DATA:    CT images and report were reviewed showing probable cholecystitis.  Ultrasound report reviewed showing a distended gallbladder with cholelithiasis and gallbladder wall thickening with a 6 mm common bile duct.    LFTs today are within normal limits with the exception of an ALT of 47.  His white count is 10.8    ASSESSMENT:    Acute cholecystitis due to gallstones    PLAN:    Based on his history, imaging, and physical exam findings he appears to have cholecystitis.  He has received antibiotics per my recommendation from the emergency department.  I discussed his diagnosis with him as well as the salient anatomy.  I recommended that he undergo cholecystectomy for treatment and he is agreeable to this.  Given that there is questionable biliary dilatation on his CT we also discussed performing a cholangiogram.  After discussion of the risks and benefits of robotic assisted laparoscopic cholecystectomy with cholangiogram he is agreeable with proceeding.   Will proceed with surgery today.          This document has been electronically signed by José Holloway MD on May 31, 2024 08:43 EDT

## 2024-05-31 NOTE — ED PROVIDER NOTES
Subjective   History of Present Illness  58-year-old male with a history of pancreatitis diagnosed in November 2023 presents with suspected recurrence with severe epigastric pain with nausea and vomiting onset at midnight.  Patient states he has chest pain but then points to his epigastrium.  Patient admits to alcohol abuse yesterday.  Patient was given aspirin, Zofran, fentanyl and route per EMS.      Review of Systems   Gastrointestinal:  Positive for abdominal pain, nausea and vomiting.       No past medical history on file.    No Known Allergies    No past surgical history on file.    No family history on file.    Social History     Socioeconomic History    Marital status:    Tobacco Use    Smoking status: Never    Smokeless tobacco: Never   Vaping Use    Vaping status: Never Used   Substance and Sexual Activity    Alcohol use: Not Currently    Drug use: Defer    Sexual activity: Defer           Objective   Physical Exam  Constitutional:       General: He is in acute distress.   HENT:      Head: Normocephalic and atraumatic.   Cardiovascular:      Rate and Rhythm: Normal rate and regular rhythm.      Heart sounds: Normal heart sounds.   Pulmonary:      Effort: Pulmonary effort is normal.      Breath sounds: Normal breath sounds.   Abdominal:      Comments: Epigastric tenderness, no rebound or guarding   Musculoskeletal:         General: Normal range of motion.   Skin:     General: Skin is warm and dry.      Capillary Refill: Capillary refill takes less than 2 seconds.   Neurological:      General: No focal deficit present.      Mental Status: He is alert and oriented to person, place, and time.   Psychiatric:         Mood and Affect: Mood normal.         Behavior: Behavior normal.         Procedures           ED Course                                             Medical Decision Making  Patient appears well but still has moderate pain, clinically correlates with radiographic findings for acute  cholecystitis, will discuss with surgery.  Will place in ED observation.    Case discussed with Dr. Holloway, agrees with IV antibiotics, n.p.o. status, will see in consultation for consideration of cholecystectomy.    Problems Addressed:  Acute cholecystitis: complicated acute illness or injury    Amount and/or Complexity of Data Reviewed  Labs: ordered.  Radiology: ordered.  ECG/medicine tests: ordered and independent interpretation performed.     Details: EKG interpretation: Sinus bradycardia, rate 56, no acute ST elevations    Risk  Prescription drug management.  Decision regarding hospitalization.        Final diagnoses:   Acute cholecystitis       ED Disposition  ED Disposition       ED Disposition   Decision to Admit    Condition   --    Comment   --               No follow-up provider specified.       Medication List      No changes were made to your prescriptions during this visit.            Subhash Macias MD  05/31/24 0700

## 2024-05-31 NOTE — PROGRESS NOTES
Synopsis  Nursing report ED to floor  Virgilio Swain  58 y.o.  male    HPI:   Chief Complaint   Patient presents with    Chest Pain       Admitting doctor:   Subhash Macias MD    Admitting diagnosis:   The encounter diagnosis was Acute cholecystitis.    Code status:   Current Code Status       Date Active Code Status Order ID Comments User Context       Not on file            Allergies:   Patient has no known allergies.    Isolation:  No active isolations     Fall Risk:  Fall Risk Assessment was completed, and patient is at low risk for falls.   Predictive Model Details         6 (Low) Factor Value    Calculated 5/31/2024 08:20 Age 58    Risk of Fall Model Active Peripheral IV Present     Imaging order in this encounter Present     Respiratory Rate 19     Magnesium not on file     Number of Distinct Medication Classes administered 4     Drug Use Not Asked     Number of administrations of Analgesic Narcotics 3     Roscoe Scale not on file     Clinically Relevant Sex Not Female     ALT 47 U/L     Diastolic BP 85     Creatinine 1.13 mg/dL     Days after Admission 0.14     Calcium 10.7 mg/dL     Total Bilirubin 0.4 mg/dL     Albumin 4.4 g/dL     Potassium 4.1 mmol/L     Chloride 105 mmol/L         Weight:       05/31/24  0455   Weight: 95.3 kg (210 lb)       Intake and Output    Intake/Output Summary (Last 24 hours) at 5/31/2024 0820  Last data filed at 5/31/2024 0744  Gross per 24 hour   Intake 100 ml   Output --   Net 100 ml       Diet:   Dietary Orders (From admission, onward)       Start     Ordered    05/31/24 0706  NPO Diet NPO Type: Strict NPO  Diet Effective Now        Question:  NPO Type  Answer:  Strict NPO    05/31/24 0706                     Most recent vitals:   Vitals:    05/31/24 0621 05/31/24 0702 05/31/24 0716 05/31/24 0732   BP: 160/81 141/66 149/82 134/85   BP Location: Right arm      Patient Position: Sitting      Pulse: 55 55 59 62   Resp: 19      Temp:       TempSrc:       SpO2: 99% 98% 97% 96%    Weight:       Height:           Active LDAs/IV Access:   Lines, Drains & Airways       Active LDAs       Name Placement date Placement time Site Days    Peripheral IV 05/31/24 0516 Left Antecubital 05/31/24 0516  Antecubital  less than 1                    Skin Condition:   Skin Assessments (last day)       None             Labs (abnormal labs have a star):   Labs Reviewed   COMPREHENSIVE METABOLIC PANEL - Abnormal; Notable for the following components:       Result Value    Glucose 117 (*)     Calcium 10.7 (*)     ALT (SGPT) 47 (*)     All other components within normal limits    Narrative:     GFR Normal >60  Chronic Kidney Disease <60  Kidney Failure <15     CBC WITH AUTO DIFFERENTIAL - Abnormal; Notable for the following components:    Immature Grans % 0.6 (*)     Neutrophils, Absolute 7.44 (*)     Monocytes, Absolute 0.93 (*)     Immature Grans, Absolute 0.06 (*)     All other components within normal limits   APTT - Abnormal; Notable for the following components:    PTT 25.2 (*)     All other components within normal limits   LIPASE - Normal   TROPONIN - Normal    Narrative:     High Sensitive Troponin T Reference Range:  <14.0 ng/L- Negative Female for AMI  <22.0 ng/L- Negative Male for AMI  >=14 - Abnormal Female indicating possible myocardial injury.  >=22 - Abnormal Male indicating possible myocardial injury.   Clinicians would have to utilize clinical acumen, EKG, Troponin, and serial changes to determine if it is an Acute Myocardial Infarction or myocardial injury due to an underlying chronic condition.        PROTIME-INR - Normal   ETHANOL    Narrative:     Plasma Ethanol Clinical Symptoms:    ETOH (%)               Clinical Symptom  .01-.05              No apparent influence  .03-.12              Euphoria, Diminished judgment and attention   .09-.25              Impaired comprehension, Muscle incoordination  .18-.30              Confusion, Staggered gait, Slurred speech  .25-.40               Markedly decreased response to stimuli, unable to stand or                        walk, vomitting, sleep or stupor  .35-.50              Comatose, Anesthesia, Subnormal body temperature       HIGH SENSITIVITIY TROPONIN T 2HR    Narrative:     High Sensitive Troponin T Reference Range:  <14.0 ng/L- Negative Female for AMI  <22.0 ng/L- Negative Male for AMI  >=14 - Abnormal Female indicating possible myocardial injury.  >=22 - Abnormal Male indicating possible myocardial injury.   Clinicians would have to utilize clinical acumen, EKG, Troponin, and serial changes to determine if it is an Acute Myocardial Infarction or myocardial injury due to an underlying chronic condition.        CBC AND DIFFERENTIAL    Narrative:     The following orders were created for panel order CBC & Differential.  Procedure                               Abnormality         Status                     ---------                               -----------         ------                     CBC Auto Differential[[id number]]        Abnormal            Final result                 Please view results for these tests on the individual orders.       LOC: Person, Place, Time, and Situation    Telemetry:  Observation Unit    Cardiac Monitoring Ordered: no    EKG:   ECG 12 Lead Chest Pain   Preliminary Result   HEART RATE= 56  bpm   RR Interval= 1068  ms   VT Interval= 133  ms   P Horizontal Axis= 24  deg   P Front Axis= 22  deg   QRSD Interval= 89  ms   QT Interval= 397  ms   QTcB= 384  ms   QRS Axis= 17  deg   T Wave Axis= 31  deg   - ABNORMAL ECG -   Atrial-paced complexes   Electronically Signed By:    Date and Time of Study: 2024-05-31 05:03:48          Medications Given in the ED:   Medications   lactated ringers infusion (150 mL/hr Intravenous New Bag 5/31/24 0744)   HYDROmorphone (DILAUDID) injection 1 mg (1 mg Intravenous Given 5/31/24 6416)   iopamidol (ISOVUE-370) 76 % injection 100 mL (100 mL Intravenous Given 5/31/24 3911)   morphine injection  4 mg (4 mg Intravenous Given 5/31/24 0619)   piperacillin-tazobactam (ZOSYN) 3.375 g IVPB in 100 mL NS MBP (CD) (0 g Intravenous Stopped 5/31/24 0744)   HYDROmorphone (DILAUDID) injection 1 mg (1 mg Intravenous Given 5/31/24 0715)       Imaging results:  US Abdomen Limited    Result Date: 5/31/2024  Impression: Findings are concerning for acute calculus cholecystitis. Electronically Signed: Idris Peterson MD  5/31/2024 6:55 AM EDT  Workstation ID: [id number]    CT Abdomen Pelvis With Contrast    Result Date: 5/31/2024  Impression: Mild distention of the gallbladder and mild biliary dilatation. Questionable gallbladder wall thickening. Correlate for symptoms of acute cholecystitis and consider ultrasound evaluation. Electronically Signed: Idris Peterson MD  5/31/2024 6:12 AM EDT  Workstation ID: [id number]    XR Chest 1 View    Result Date: 5/31/2024  Impression: No acute cardiopulmonary abnormality. Electronically Signed: Idris Peterson MD  5/31/2024 5:27 AM EDT  Workstation ID: [id number]     Social issues:   Social History     Socioeconomic History    Marital status:    Tobacco Use    Smoking status: Never    Smokeless tobacco: Never   Vaping Use    Vaping status: Never Used   Substance and Sexual Activity    Alcohol use: Not Currently    Drug use: Defer    Sexual activity: Defer       NIH Stroke Scale:  Interval: (not recorded)  1a. Level of Consciousness: (not recorded)  1b. LOC Questions: (not recorded)  1c. LOC Commands: (not recorded)  2. Best Gaze: (not recorded)  3. Visual: (not recorded)  4. Facial Palsy: (not recorded)  5a. Motor Arm, Left: (not recorded)  5b. Motor Arm, Right: (not recorded)  6a. Motor Leg, Left: (not recorded)  6b. Motor Leg, Right: (not recorded)  7. Limb Ataxia: (not recorded)  8. Sensory: (not recorded)  9. Best Language: (not recorded)  10. Dysarthria: (not recorded)  11. Extinction and Inattention (formerly Neglect): (not recorded)    Total (NIH Stroke Scale): (not recorded)      Additional notable assessment information:     Nursing report ED to floor:  SHANIA Brown RN   05/31/24 08:20 EDT       Other

## 2024-06-01 ENCOUNTER — ANESTHESIA EVENT (OUTPATIENT)
Dept: GASTROENTEROLOGY | Facility: HOSPITAL | Age: 59
End: 2024-06-01
Payer: COMMERCIAL

## 2024-06-01 ENCOUNTER — ON CAMPUS - OUTPATIENT (OUTPATIENT)
Dept: URBAN - METROPOLITAN AREA HOSPITAL 85 | Facility: HOSPITAL | Age: 59
End: 2024-06-01

## 2024-06-01 ENCOUNTER — APPOINTMENT (OUTPATIENT)
Dept: GENERAL RADIOLOGY | Facility: HOSPITAL | Age: 59
End: 2024-06-01
Payer: COMMERCIAL

## 2024-06-01 ENCOUNTER — ANESTHESIA (OUTPATIENT)
Dept: GASTROENTEROLOGY | Facility: HOSPITAL | Age: 59
End: 2024-06-01
Payer: COMMERCIAL

## 2024-06-01 DIAGNOSIS — R93.2 ABNORMAL FINDINGS ON DIAGNOSTIC IMAGING OF LIVER AND BILIARY: ICD-10-CM

## 2024-06-01 DIAGNOSIS — K80.50 CALCULUS OF BILE DUCT WITHOUT CHOLANGITIS OR CHOLECYSTITIS W: ICD-10-CM

## 2024-06-01 LAB
ALBUMIN SERPL-MCNC: 4 G/DL (ref 3.5–5.2)
ALBUMIN/GLOB SERPL: 1.7 G/DL
ALP SERPL-CCNC: 65 U/L (ref 39–117)
ALT SERPL W P-5'-P-CCNC: 62 U/L (ref 1–41)
ANION GAP SERPL CALCULATED.3IONS-SCNC: 8.3 MMOL/L (ref 5–15)
AST SERPL-CCNC: 36 U/L (ref 1–40)
BASOPHILS # BLD AUTO: 0.06 10*3/MM3 (ref 0–0.2)
BASOPHILS NFR BLD AUTO: 0.5 % (ref 0–1.5)
BILIRUB SERPL-MCNC: 1 MG/DL (ref 0–1.2)
BUN SERPL-MCNC: 11 MG/DL (ref 6–20)
BUN/CREAT SERPL: 10.6 (ref 7–25)
CALCIUM SPEC-SCNC: 9.2 MG/DL (ref 8.6–10.5)
CHLORIDE SERPL-SCNC: 104 MMOL/L (ref 98–107)
CO2 SERPL-SCNC: 27.7 MMOL/L (ref 22–29)
CREAT SERPL-MCNC: 1.04 MG/DL (ref 0.76–1.27)
DEPRECATED RDW RBC AUTO: 43.7 FL (ref 37–54)
EGFRCR SERPLBLD CKD-EPI 2021: 83.7 ML/MIN/1.73
EOSINOPHIL # BLD AUTO: 0.09 10*3/MM3 (ref 0–0.4)
EOSINOPHIL NFR BLD AUTO: 0.7 % (ref 0.3–6.2)
ERYTHROCYTE [DISTWIDTH] IN BLOOD BY AUTOMATED COUNT: 13.9 % (ref 12.3–15.4)
GLOBULIN UR ELPH-MCNC: 2.4 GM/DL
GLUCOSE SERPL-MCNC: 113 MG/DL (ref 65–99)
HCT VFR BLD AUTO: 41.2 % (ref 37.5–51)
HGB BLD-MCNC: 13.5 G/DL (ref 13–17.7)
IMM GRANULOCYTES # BLD AUTO: 0.06 10*3/MM3 (ref 0–0.05)
IMM GRANULOCYTES NFR BLD AUTO: 0.5 % (ref 0–0.5)
INR PPP: 1.05 (ref 0.93–1.1)
LIPASE SERPL-CCNC: 32 U/L (ref 13–60)
LYMPHOCYTES # BLD AUTO: 2.62 10*3/MM3 (ref 0.7–3.1)
LYMPHOCYTES NFR BLD AUTO: 21.4 % (ref 19.6–45.3)
MCH RBC QN AUTO: 28.1 PG (ref 26.6–33)
MCHC RBC AUTO-ENTMCNC: 32.8 G/DL (ref 31.5–35.7)
MCV RBC AUTO: 85.8 FL (ref 79–97)
MONOCYTES # BLD AUTO: 0.81 10*3/MM3 (ref 0.1–0.9)
MONOCYTES NFR BLD AUTO: 6.6 % (ref 5–12)
NEUTROPHILS NFR BLD AUTO: 70.3 % (ref 42.7–76)
NEUTROPHILS NFR BLD AUTO: 8.6 10*3/MM3 (ref 1.7–7)
NRBC BLD AUTO-RTO: 0 /100 WBC (ref 0–0.2)
PLATELET # BLD AUTO: 201 10*3/MM3 (ref 140–450)
PMV BLD AUTO: 10.8 FL (ref 6–12)
POTASSIUM SERPL-SCNC: 4.1 MMOL/L (ref 3.5–5.2)
PROT SERPL-MCNC: 6.4 G/DL (ref 6–8.5)
PROTHROMBIN TIME: 11.4 SECONDS (ref 9.6–11.7)
RBC # BLD AUTO: 4.8 10*6/MM3 (ref 4.14–5.8)
SODIUM SERPL-SCNC: 140 MMOL/L (ref 136–145)
WBC NRBC COR # BLD AUTO: 12.24 10*3/MM3 (ref 3.4–10.8)

## 2024-06-01 PROCEDURE — 25810000003 LACTATED RINGERS PER 1000 ML: Performed by: INTERNAL MEDICINE

## 2024-06-01 PROCEDURE — G0378 HOSPITAL OBSERVATION PER HR: HCPCS

## 2024-06-01 PROCEDURE — 83690 ASSAY OF LIPASE: CPT | Performed by: NURSE PRACTITIONER

## 2024-06-01 PROCEDURE — 25510000001 IOPAMIDOL 61 % SOLUTION 30 ML VIAL: Performed by: INTERNAL MEDICINE

## 2024-06-01 PROCEDURE — 25010000002 ONDANSETRON PER 1 MG: Performed by: NURSE ANESTHETIST, CERTIFIED REGISTERED

## 2024-06-01 PROCEDURE — 43264 ERCP REMOVE DUCT CALCULI: CPT | Performed by: NURSE PRACTITIONER

## 2024-06-01 PROCEDURE — 25010000002 DEXAMETHASONE PER 1 MG: Performed by: NURSE ANESTHETIST, CERTIFIED REGISTERED

## 2024-06-01 PROCEDURE — 25010000002 PIPERACILLIN SOD-TAZOBACTAM PER 1 G: Performed by: SURGERY

## 2024-06-01 PROCEDURE — 85610 PROTHROMBIN TIME: CPT | Performed by: NURSE PRACTITIONER

## 2024-06-01 PROCEDURE — 25810000003 SODIUM CHLORIDE 0.9 % SOLUTION: Performed by: NURSE ANESTHETIST, CERTIFIED REGISTERED

## 2024-06-01 PROCEDURE — 25010000002 SUGAMMADEX 200 MG/2ML SOLUTION: Performed by: NURSE ANESTHETIST, CERTIFIED REGISTERED

## 2024-06-01 PROCEDURE — 80053 COMPREHEN METABOLIC PANEL: CPT | Performed by: SURGERY

## 2024-06-01 PROCEDURE — 25010000002 THIAMINE PER 100 MG: Performed by: INTERNAL MEDICINE

## 2024-06-01 PROCEDURE — 25010000002 HYDROMORPHONE 1 MG/ML SOLUTION: Performed by: SURGERY

## 2024-06-01 PROCEDURE — C1769 GUIDE WIRE: HCPCS | Performed by: INTERNAL MEDICINE

## 2024-06-01 PROCEDURE — 74330 X-RAY BILE/PANC ENDOSCOPY: CPT

## 2024-06-01 PROCEDURE — 99024 POSTOP FOLLOW-UP VISIT: CPT

## 2024-06-01 PROCEDURE — 25010000002 HYDROMORPHONE 1 MG/ML SOLUTION: Performed by: INTERNAL MEDICINE

## 2024-06-01 PROCEDURE — 25010000002 THIAMINE PER 100 MG: Performed by: PHYSICIAN ASSISTANT

## 2024-06-01 PROCEDURE — 85025 COMPLETE CBC W/AUTO DIFF WBC: CPT | Performed by: SURGERY

## 2024-06-01 PROCEDURE — 25010000002 FENTANYL CITRATE (PF) 100 MCG/2ML SOLUTION: Performed by: NURSE ANESTHETIST, CERTIFIED REGISTERED

## 2024-06-01 PROCEDURE — 43262 ENDO CHOLANGIOPANCREATOGRAPH: CPT | Performed by: NURSE PRACTITIONER

## 2024-06-01 RX ORDER — FENTANYL CITRATE 50 UG/ML
INJECTION, SOLUTION INTRAMUSCULAR; INTRAVENOUS AS NEEDED
Status: DISCONTINUED | OUTPATIENT
Start: 2024-06-01 | End: 2024-06-01 | Stop reason: SURG

## 2024-06-01 RX ORDER — INDOMETHACIN 100 MG
SUPPOSITORY, RECTAL RECTAL AS NEEDED
Status: DISCONTINUED | OUTPATIENT
Start: 2024-06-01 | End: 2024-06-01 | Stop reason: HOSPADM

## 2024-06-01 RX ORDER — HYDRALAZINE HYDROCHLORIDE 20 MG/ML
5 INJECTION INTRAMUSCULAR; INTRAVENOUS
Status: DISCONTINUED | OUTPATIENT
Start: 2024-06-01 | End: 2024-06-01 | Stop reason: HOSPADM

## 2024-06-01 RX ORDER — MEPERIDINE HYDROCHLORIDE 25 MG/ML
12.5 INJECTION INTRAMUSCULAR; INTRAVENOUS; SUBCUTANEOUS
Status: DISCONTINUED | OUTPATIENT
Start: 2024-06-01 | End: 2024-06-01 | Stop reason: HOSPADM

## 2024-06-01 RX ORDER — PROMETHAZINE HYDROCHLORIDE 25 MG/1
25 TABLET ORAL ONCE AS NEEDED
Status: DISCONTINUED | OUTPATIENT
Start: 2024-06-01 | End: 2024-06-01 | Stop reason: HOSPADM

## 2024-06-01 RX ORDER — HYDROCODONE BITARTRATE AND ACETAMINOPHEN 7.5; 325 MG/1; MG/1
1 TABLET ORAL EVERY 4 HOURS PRN
Status: DISCONTINUED | OUTPATIENT
Start: 2024-06-01 | End: 2024-06-01 | Stop reason: HOSPADM

## 2024-06-01 RX ORDER — ACETAMINOPHEN 325 MG/1
650 TABLET ORAL ONCE AS NEEDED
Status: DISCONTINUED | OUTPATIENT
Start: 2024-06-01 | End: 2024-06-01 | Stop reason: HOSPADM

## 2024-06-01 RX ORDER — DEXAMETHASONE SODIUM PHOSPHATE 4 MG/ML
INJECTION, SOLUTION INTRA-ARTICULAR; INTRALESIONAL; INTRAMUSCULAR; INTRAVENOUS; SOFT TISSUE AS NEEDED
Status: DISCONTINUED | OUTPATIENT
Start: 2024-06-01 | End: 2024-06-01 | Stop reason: SURG

## 2024-06-01 RX ORDER — DIPHENHYDRAMINE HYDROCHLORIDE 50 MG/ML
12.5 INJECTION INTRAMUSCULAR; INTRAVENOUS ONCE AS NEEDED
Status: DISCONTINUED | OUTPATIENT
Start: 2024-06-01 | End: 2024-06-01 | Stop reason: HOSPADM

## 2024-06-01 RX ORDER — ROCURONIUM BROMIDE 10 MG/ML
INJECTION, SOLUTION INTRAVENOUS AS NEEDED
Status: DISCONTINUED | OUTPATIENT
Start: 2024-06-01 | End: 2024-06-01 | Stop reason: SURG

## 2024-06-01 RX ORDER — SODIUM CHLORIDE 9 MG/ML
INJECTION, SOLUTION INTRAVENOUS CONTINUOUS PRN
Status: DISCONTINUED | OUTPATIENT
Start: 2024-06-01 | End: 2024-06-01 | Stop reason: SURG

## 2024-06-01 RX ORDER — LABETALOL HYDROCHLORIDE 5 MG/ML
5 INJECTION, SOLUTION INTRAVENOUS
Status: DISCONTINUED | OUTPATIENT
Start: 2024-06-01 | End: 2024-06-01 | Stop reason: HOSPADM

## 2024-06-01 RX ORDER — FENTANYL CITRATE 50 UG/ML
50 INJECTION, SOLUTION INTRAMUSCULAR; INTRAVENOUS
Status: DISCONTINUED | OUTPATIENT
Start: 2024-06-01 | End: 2024-06-01 | Stop reason: HOSPADM

## 2024-06-01 RX ORDER — HYDROCODONE BITARTRATE AND ACETAMINOPHEN 5; 325 MG/1; MG/1
1 TABLET ORAL ONCE AS NEEDED
Status: DISCONTINUED | OUTPATIENT
Start: 2024-06-01 | End: 2024-06-01 | Stop reason: HOSPADM

## 2024-06-01 RX ORDER — ALBUTEROL SULFATE 2.5 MG/3ML
2.5 SOLUTION RESPIRATORY (INHALATION) ONCE AS NEEDED
Status: DISCONTINUED | OUTPATIENT
Start: 2024-06-01 | End: 2024-06-01 | Stop reason: HOSPADM

## 2024-06-01 RX ORDER — DROPERIDOL 2.5 MG/ML
0.62 INJECTION, SOLUTION INTRAMUSCULAR; INTRAVENOUS ONCE AS NEEDED
Status: DISCONTINUED | OUTPATIENT
Start: 2024-06-01 | End: 2024-06-01 | Stop reason: HOSPADM

## 2024-06-01 RX ORDER — ONDANSETRON 2 MG/ML
4 INJECTION INTRAMUSCULAR; INTRAVENOUS ONCE AS NEEDED
Status: DISCONTINUED | OUTPATIENT
Start: 2024-06-01 | End: 2024-06-01 | Stop reason: HOSPADM

## 2024-06-01 RX ORDER — NALOXONE HCL 0.4 MG/ML
0.4 VIAL (ML) INJECTION AS NEEDED
Status: DISCONTINUED | OUTPATIENT
Start: 2024-06-01 | End: 2024-06-01 | Stop reason: HOSPADM

## 2024-06-01 RX ORDER — DIPHENHYDRAMINE HYDROCHLORIDE 50 MG/ML
12.5 INJECTION INTRAMUSCULAR; INTRAVENOUS
Status: DISCONTINUED | OUTPATIENT
Start: 2024-06-01 | End: 2024-06-01 | Stop reason: HOSPADM

## 2024-06-01 RX ORDER — ACETAMINOPHEN 650 MG/1
650 SUPPOSITORY RECTAL EVERY 4 HOURS PRN
Status: DISCONTINUED | OUTPATIENT
Start: 2024-06-01 | End: 2024-06-01 | Stop reason: HOSPADM

## 2024-06-01 RX ORDER — ONDANSETRON 2 MG/ML
INJECTION INTRAMUSCULAR; INTRAVENOUS AS NEEDED
Status: DISCONTINUED | OUTPATIENT
Start: 2024-06-01 | End: 2024-06-01 | Stop reason: SURG

## 2024-06-01 RX ORDER — PROMETHAZINE HYDROCHLORIDE 25 MG/1
25 SUPPOSITORY RECTAL ONCE AS NEEDED
Status: DISCONTINUED | OUTPATIENT
Start: 2024-06-01 | End: 2024-06-01 | Stop reason: HOSPADM

## 2024-06-01 RX ADMIN — THIAMINE HYDROCHLORIDE 200 MG: 100 INJECTION, SOLUTION INTRAMUSCULAR; INTRAVENOUS at 05:20

## 2024-06-01 RX ADMIN — FENTANYL CITRATE 100 MCG: 50 INJECTION, SOLUTION INTRAMUSCULAR; INTRAVENOUS at 11:01

## 2024-06-01 RX ADMIN — ALUMINUM HYDROXIDE, MAGNESIUM HYDROXIDE, AND DIMETHICONE 15 ML: 400; 400; 40 SUSPENSION ORAL at 19:06

## 2024-06-01 RX ADMIN — HYDROMORPHONE HYDROCHLORIDE 0.5 MG: 1 INJECTION, SOLUTION INTRAMUSCULAR; INTRAVENOUS; SUBCUTANEOUS at 09:03

## 2024-06-01 RX ADMIN — THIAMINE HYDROCHLORIDE 200 MG: 100 INJECTION, SOLUTION INTRAMUSCULAR; INTRAVENOUS at 18:14

## 2024-06-01 RX ADMIN — LORAZEPAM 1 MG: 1 TABLET ORAL at 22:41

## 2024-06-01 RX ADMIN — PIPERACILLIN AND TAZOBACTAM 3.38 G: 3; .375 INJECTION, POWDER, FOR SOLUTION INTRAVENOUS at 00:30

## 2024-06-01 RX ADMIN — HYDROCODONE BITARTRATE AND ACETAMINOPHEN 1 TABLET: 5; 325 TABLET ORAL at 14:31

## 2024-06-01 RX ADMIN — SODIUM CHLORIDE: 9 INJECTION, SOLUTION INTRAVENOUS at 11:00

## 2024-06-01 RX ADMIN — DEXAMETHASONE SODIUM PHOSPHATE 8 MG: 4 INJECTION, SOLUTION INTRAMUSCULAR; INTRAVENOUS at 11:10

## 2024-06-01 RX ADMIN — HYDROMORPHONE HYDROCHLORIDE 0.5 MG: 1 INJECTION, SOLUTION INTRAMUSCULAR; INTRAVENOUS; SUBCUTANEOUS at 05:21

## 2024-06-01 RX ADMIN — PANTOPRAZOLE SODIUM 40 MG: 40 INJECTION, POWDER, FOR SOLUTION INTRAVENOUS at 18:15

## 2024-06-01 RX ADMIN — THIAMINE HYDROCHLORIDE 200 MG: 100 INJECTION, SOLUTION INTRAMUSCULAR; INTRAVENOUS at 22:41

## 2024-06-01 RX ADMIN — Medication 10 ML: at 20:56

## 2024-06-01 RX ADMIN — PANTOPRAZOLE SODIUM 40 MG: 40 INJECTION, POWDER, FOR SOLUTION INTRAVENOUS at 09:03

## 2024-06-01 RX ADMIN — LORAZEPAM 2 MG: 1 TABLET ORAL at 05:20

## 2024-06-01 RX ADMIN — HYDROMORPHONE HYDROCHLORIDE 0.5 MG: 1 INJECTION, SOLUTION INTRAMUSCULAR; INTRAVENOUS; SUBCUTANEOUS at 20:56

## 2024-06-01 RX ADMIN — SUGAMMADEX 200 MG: 100 INJECTION, SOLUTION INTRAVENOUS at 11:55

## 2024-06-01 RX ADMIN — ROCURONIUM BROMIDE 50 MG: 10 INJECTION, SOLUTION INTRAVENOUS at 11:04

## 2024-06-01 RX ADMIN — SODIUM CHLORIDE, POTASSIUM CHLORIDE, SODIUM LACTATE AND CALCIUM CHLORIDE 150 ML/HR: 600; 310; 30; 20 INJECTION, SOLUTION INTRAVENOUS at 22:41

## 2024-06-01 RX ADMIN — ONDANSETRON 4 MG: 2 INJECTION INTRAMUSCULAR; INTRAVENOUS at 11:22

## 2024-06-01 RX ADMIN — FOLIC ACID 1 MG: 1 TABLET ORAL at 09:03

## 2024-06-01 RX ADMIN — LORAZEPAM 1 MG: 1 TABLET ORAL at 18:14

## 2024-06-01 NOTE — PROGRESS NOTES
[facility] Observation Unit Progress note    Patient Name: Virgilio Swain  : 1965  MRN: [id number]  Primary Care Physician: Favian Moore MD  Date of admission: 2024     Patient Care Team:  Favian Moore MD as PCP - General (Family Medicine)          Subjective   History Present Illness     Chief Complaint:   Chief Complaint   Patient presents with    Chest Pain     Epigastric pain    History of Present Illness    ED  58-year-old male with a history of pancreatitis diagnosed in 2023 presents with suspected recurrence with severe epigastric pain with nausea and vomiting onset at midnight. Patient states he has chest pain but then points to his epigastrium. Patient admits to alcohol abuse yesterday. Patient was given aspirin, Zofran, fentanyl and route per EMS.      Observation 24  General surgery and GI consulted. Pt having cholecystectomy today.     Observation 24  Gi following. ERCP today. Clear liquid diet. Home tomorrow    ROS    Cardiovascular:  Positive for chest pain.   Gastrointestinal:  Positive for abdominal pain, nausea and vomiting.     Personal History     Past Medical History: History reviewed. No pertinent past medical history.    Surgical History:    History reviewed. No pertinent surgical history.        Family History: family history is not on file. Otherwise pertinent FHx was reviewed and unremarkable.     Social History:  reports that he has never smoked. He has never used smokeless tobacco. He reports that he does not currently use alcohol. Drug use questions deferred to the physician.      Medications:  Prior to Admission medications    Not on File       Allergies:  No Known Allergies    Objective   Objective     Vital Signs  Temp:  [97.4 °F (36.3 °C)-98.5 °F (36.9 °C)] 97.8 °F (36.6 °C)  Heart Rate:  [52-74] 74  Resp:  [10-21] 21  BP: (107-146)/(71-89) 146/89  SpO2:  [92 %-99 %] 95 %  on  Flow (L/min):  [6] 6;   Device (Oxygen Therapy): room air  Body mass index is  28.48 kg/m².    Physical Exam  Constitutional:       Appearance: Normal appearance.   Cardiovascular:      Rate and Rhythm: Normal rate and regular rhythm.      Pulses: Normal pulses.      Heart sounds: Normal heart sounds.   Pulmonary:      Effort: Pulmonary effort is normal.      Breath sounds: Normal breath sounds.   Neurological:      General: No focal deficit present.      Mental Status: He is alert and oriented to person, place, and time.   Psychiatric:         Mood and Affect: Mood normal.         Behavior: Behavior normal.        Results Review:  I have personally reviewed most recent cardiac tracings, lab results, and radiology images and interpretations and agree with findings, most notably: cbc, cmp, ethanol, lipase, troponin, inr, us abd, ct abd, chest xray, ekg.    Results from last 7 days   Lab Units 06/01/24  0542   WBC 10*3/mm3 12.24*   HEMOGLOBIN g/dL 13.5   HEMATOCRIT % 41.2   PLATELETS 10*3/mm3 201   INR  1.05     Results from last 7 days   Lab Units 06/01/24  0542 05/31/24  0714 05/31/24  0512   SODIUM mmol/L 140  --  140   POTASSIUM mmol/L 4.1  --  4.1   CHLORIDE mmol/L 104  --  105   CO2 mmol/L 27.7  --  22.9   BUN mg/dL 11  --  15   CREATININE mg/dL 1.04  --  1.13   GLUCOSE mg/dL 113*  --  117*   CALCIUM mg/dL 9.2  --  10.7*   ALK PHOS U/L 65  --  72   ALT (SGPT) U/L 62*  --  47*   AST (SGOT) U/L 36  --  24   HSTROP T ng/L  --  <6 <6     Estimated Creatinine Clearance: 93.9 mL/min (by C-G formula based on SCr of 1.04 mg/dL).  Brief Urine Lab Results  (Last result in the past 365 days)        Color   Clarity   Blood   Leuk Est   Nitrite   Protein   CREAT   Urine HCG        12/29/23 1321 Yellow   Turbid   Negative   Negative   Negative   Negative                   Microbiology Results (last 10 days)       ** No results found for the last 240 hours. **            ECG/EMG Results (most recent)       Procedure Component Value Units Date/Time    ECG 12 Lead Chest Pain [[id number]] Collected: 05/31/24  0503     Updated: 05/31/24 1057     QT Interval 397 ms      QTC Interval 384 ms     Narrative:      HEART RATE= 56  bpm  RR Interval= 1068  ms  HI Interval= 133  ms  P Horizontal Axis= 24  deg  P Front Axis= 22  deg  QRSD Interval= 89  ms  QT Interval= 397  ms  QTcB= 384  ms  QRS Axis= 17  deg  T Wave Axis= 31  deg  - ABNORMAL ECG -  Atrial-paced complexes  When compared with ECG of 23-Nov-2023 20:55:25,  Significant change in rhythm  Electronically Signed By: Subhash Macias (Channing) 31-May-2024 10:56:55  Date and Time of Study: 2024-05-31 05:03:48    Telemetry Scan [[id number]] Resulted: 05/31/24     Updated: 05/31/24 1131    Telemetry Scan [[id number]] Resulted: 05/31/24     Updated: 06/01/24 1154                    FL Cholangiogram Operative    Result Date: 5/31/2024  Impression: Intraoperative cholangiogram with fluoroscopy. Please refer to the procedure report for findings and recommendations Electronically Signed: Radha Jones MD  5/31/2024 12:00 PM EDT  Workstation ID: [id number]    US Abdomen Limited    Result Date: 5/31/2024  Impression: Findings are concerning for acute calculus cholecystitis. Electronically Signed: Idris Peterson MD  5/31/2024 6:55 AM EDT  Workstation ID: [id number]    CT Abdomen Pelvis With Contrast    Result Date: 5/31/2024  Impression: Mild distention of the gallbladder and mild biliary dilatation. Questionable gallbladder wall thickening. Correlate for symptoms of acute cholecystitis and consider ultrasound evaluation. Electronically Signed: Idris Peterson MD  5/31/2024 6:12 AM EDT  Workstation ID: [id number]    XR Chest 1 View    Result Date: 5/31/2024  Impression: No acute cardiopulmonary abnormality. Electronically Signed: Idris Peterson MD  5/31/2024 5:27 AM EDT  Workstation ID: [id number]       Estimated Creatinine Clearance: 93.9 mL/min (by C-G formula based on SCr of 1.04 mg/dL).    Assessment & Plan   Assessment/Plan       Active Hospital Problems    Diagnosis  POA    **Cholecystitis  [K81.9]  Yes    Choledocholithiasis [K80.50]  Unknown      Resolved Hospital Problems   No resolved problems to display.     Cholecystitis/choledocholithiasis  - cbc, cmp, lipase, troponin, inr unremarkable  - chest xray is unremarkable  - ct abd showing mild distention of the gallbladder and mild biliary dilatation. Questionable gallbladder wall thickening.   - us abd reviewed and showing acute calculus cholecystitis  - EKG rate 56 pacs  - general surgery consulted  - cholecystectomy performed  - Gastroenterology consulted for choledocholithiasis  - ERCP - stone extracted  - no asa, nsaids, blood thinners for 7 days  - clear liquid diet  - dc in am     Etoh abuse  - pt is frequent drinker  - last drink was yesterday  - ethanol <.01    VTE Prophylaxis -   Mechanical Order History:        Ordered        05/31/24 1247  Maintain Sequential Compression Device  Continuous            05/31/24 1100  Place Sequential Compression Device  Once                          Pharmalogical Order History:       None            CODE STATUS:    Code Status and Medical Interventions:   Ordered at: 05/31/24 1247     Code Status (Patient has no pulse and is not breathing):    CPR (Attempt to Resuscitate)     Medical Interventions (Patient has pulse or is breathing):    Full Support       This patient has been examined wearing personal protective equipment.     I discussed the patient's findings and my recommendations with patient and nursing staff.      Signature:Electronically signed by Susan Anaya PA-C, 06/01/24, 2:52 PM EDT.

## 2024-06-01 NOTE — PROGRESS NOTES
General Surgery Progress Note    Name: Virgilio Swain ADMIT: 2024   : 1965  PCP: Favian Moore MD    MRN: [id number] LOS: 0 days   AGE/SEX: 58 y.o. male  ROOM: [address] [place] [address]    Chief Complaint   Patient presents with    Chest Pain     Subjective     Patient seen and examined.  Vital signs stable, afebrile.  Had ERCP with GI this morning.  Reports pain with movement.  Has been using IV Dilaudid and Norco for pain control. Has tolerated clear liquids, denies nausea or vomiting.  Passing some flatus, no BM.  WBC count 12.24 this morning, hemoglobin stable.    Objective     Scheduled Medications:   folic acid, 1 mg, Oral, Daily  LORazepam, 2 mg, Oral, Q6H   Followed by  LORazepam, 1 mg, Oral, Q6H   Followed by  [START ON 2024] LORazepam, 1 mg, Oral, Q12H   Followed by  [START ON 2024] LORazepam, 1 mg, Oral, Daily  pantoprazole, 40 mg, Intravenous, BID AC  sodium chloride, 10 mL, Intravenous, Q12H  thiamine (B-1) IV, 200 mg, Intravenous, Q8H   Followed by  [START ON 2024] thiamine, 100 mg, Oral, Daily        Active Infusions:  lactated ringers, 150 mL/hr, Last Rate: 150 mL/hr (24 0534)  niCARdipine, 5-15 mg/hr  sodium chloride, 30 mL/hr        As Needed Medications:    acetaminophen **OR** acetaminophen    aluminum-magnesium hydroxide-simethicone    HYDROcodone-acetaminophen    HYDROmorphone **AND** naloxone    LORazepam **OR** LORazepam **OR** LORazepam **OR** LORazepam **OR** LORazepam **OR** LORazepam    Magnesium Standard Dose Replacement - Follow Nurse / BPA Driven Protocol    niCARdipine    nitroglycerin    ondansetron ODT **OR** ondansetron    ondansetron ODT **OR** ondansetron    sodium chloride    sodium chloride    sodium chloride    Vital Signs  Vital Signs Patient Vitals for the past 24 hrs:   BP Temp Temp src Pulse Resp SpO2   24 1435 146/89 97.8 °F (36.6 °C) Oral -- 21 --   24 1250 129/75 97.6 °F (36.4 °C) -- 74 13 95 %   24 1234 132/84 -- -- 73 13 94 %    06/01/24 1220 131/80 -- -- 70 10 94 %   06/01/24 1215 138/78 -- -- 66 12 97 %   06/01/24 1210 134/77 -- -- 64 16 99 %   06/01/24 1205 145/80 97.4 °F (36.3 °C) -- 67 17 98 %   06/01/24 0630 126/72 98.5 °F (36.9 °C) Oral 62 -- 99 %   06/01/24 0318 137/72 98.5 °F (36.9 °C) Oral 52 17 96 %   05/31/24 2231 107/75 98.2 °F (36.8 °C) Oral 72 18 92 %   05/31/24 1806 115/71 97.7 °F (36.5 °C) Oral -- 14 --     I/O:  I/O last 3 completed shifts:  In: 2977 [P.O.:720; I.V.:2057; IV Piggyback:200]  Out: -     Physical Exam:  Physical Exam  Constitutional:       General: He is not in acute distress.  Cardiovascular:      Rate and Rhythm: Normal rate.   Pulmonary:      Effort: Pulmonary effort is normal. No respiratory distress.   Abdominal:      General: There is distension.      Palpations: Abdomen is soft.      Tenderness: There is abdominal tenderness. There is no guarding.      Comments: Incisions clean dry and intact.   Neurological:      Mental Status: He is alert and oriented to person, place, and time.   Psychiatric:         Mood and Affect: Mood normal.         Behavior: Behavior normal.         Results Review:     CBC    Results from last 7 days   Lab Units 06/01/24  0542 05/31/24  0512   WBC 10*3/mm3 12.24* 10.78   HEMOGLOBIN g/dL 13.5 14.4   PLATELETS 10*3/mm3 201 240     BMP   Results from last 7 days   Lab Units 06/01/24  0542 05/31/24  0512   SODIUM mmol/L 140 140   POTASSIUM mmol/L 4.1 4.1   CHLORIDE mmol/L 104 105   CO2 mmol/L 27.7 22.9   BUN mg/dL 11 15   CREATININE mg/dL 1.04 1.13   GLUCOSE mg/dL 113* 117*     Radiology(recent) FL Cholangiogram Operative    Result Date: 5/31/2024  Impression: Intraoperative cholangiogram with fluoroscopy. Please refer to the procedure report for findings and recommendations Electronically Signed: Radha Haycraft, MD  5/31/2024 12:00 PM EDT  Workstation ID: [id number]    US Abdomen Limited    Result Date: 5/31/2024  Impression: Findings are concerning for acute calculus  cholecystitis. Electronically Signed: Idris Peterson MD  5/31/2024 6:55 AM EDT  Workstation ID: [id number]    CT Abdomen Pelvis With Contrast    Result Date: 5/31/2024  Impression: Mild distention of the gallbladder and mild biliary dilatation. Questionable gallbladder wall thickening. Correlate for symptoms of acute cholecystitis and consider ultrasound evaluation. Electronically Signed: Idris Peterson MD  5/31/2024 6:12 AM EDT  Workstation ID: [id number]    XR Chest 1 View    Result Date: 5/31/2024  Impression: No acute cardiopulmonary abnormality. Electronically Signed: Idris Peterson MD  5/31/2024 5:27 AM EDT  Workstation ID: [id number]     I reviewed the patient's new clinical results.    Assessment & Plan       Cholecystitis    Choledocholithiasis      58 y.o. male POD 1 status post robotic cholecystectomy, now status post ERCP 6/1/2024    -Continue clear liquids for today, will advance diet morning if doing well  -Antiemetics and pain medication as needed.  Discussed using p.o. pain medication for pain control in hopes of discharging tomorrow morning  -Encourage ambulation, out of bed to chair  -Use incentive spirometer bedside 10 times an hour  -Will check a.m. labs tomorrow morning  -If patient continues to progress, anticipate discharging tomorrow morning or afternoon        This note was created using Dragon Voice Recognition software.    LALITO Champagne  06/01/24  16:01 EDT

## 2024-06-01 NOTE — ANESTHESIA POSTPROCEDURE EVALUATION
Patient: Virgilio Swain    Procedure Summary       Date: 06/01/24 Room / Location: [facility] ENDOSCOPY 1 / [facility] ENDOSCOPY    Anesthesia Start: 1100 Anesthesia Stop: 1215    Procedure: ENDOSCOPIC RETROGRADE CHOLANGIOPANCREATOGRAPHY, SPHINTERTOMY, CLEARANCE OF BILE DUCT WITH BALLOON (9MM-12MM, UP TO 12MM), REMOVAL OF BILIARY STONE WITH BALLOON, AND INTRAOPERATIVE CHOLANGIOGRAM Diagnosis:       Choledocholithiasis      (Choledocholithiasis [K80.50])    Surgeons: Esthela Maldonado MD Provider: Delmar Haddad MD    Anesthesia Type: general ASA Status: 2            Anesthesia Type: general    Vitals  Vitals Value Taken Time   /89 06/01/24 1437   Temp 97.8 °F (36.6 °C) 06/01/24 1435   Pulse 72 06/01/24 1736   Resp 21 06/01/24 1435   SpO2 95 % 06/01/24 1736   Vitals shown include unfiled device data.        Post Anesthesia Care and Evaluation    Patient location during evaluation: PACU  Patient participation: complete - patient participated  Level of consciousness: awake  Pain scale: See nurse's notes for pain score.  Pain management: adequate    Airway patency: patent  Anesthetic complications: No anesthetic complications  PONV Status: none  Cardiovascular status: acceptable  Respiratory status: acceptable and spontaneous ventilation  Hydration status: acceptable    Comments: Patient seen and examined postoperatively; vital signs stable; SpO2 greater than or equal to 90%; cardiopulmonary status stable; nausea/vomiting adequately controlled; pain adequately controlled; no apparent anesthesia complications; patient discharged from anesthesia care when discharge criteria were met

## 2024-06-01 NOTE — DISCHARGE INSTRUCTIONS
Call office to make a follow-up appointment with Dr. Holloway in 2 weeks  Okay to shower starting tomorrow using soap and water.  Do not submerge incisions, no baths/soaking for 2 weeks  Recommend low-fat diet for next 2 weeks  Do not lift greater than 10 to 15 pounds for 2 weeks  Do not drive while taking narcotics  Over the counter stool softener twice daily until off narcotics and having regular bowel movements  Milk of magnesia as needed if still constipated with stool softeners

## 2024-06-01 NOTE — ANESTHESIA PROCEDURE NOTES
Airway  Urgency: elective    Date/Time: 6/1/2024 11:06 AM  Airway not difficult    General Information and Staff    Patient location during procedure: OR  CRNA/CAA: Emi Pacheco CRNA    Indications and Patient Condition  Indications for airway management: airway protection    Preoxygenated: yes  MILS not maintained throughout  Mask difficulty assessment: 1 - vent by mask    Final Airway Details  Final airway type: endotracheal airway      Successful airway: ETT  Cuffed: yes   Successful intubation technique: direct laryngoscopy and video laryngoscopy  Facilitating devices/methods: intubating stylet  Endotracheal tube insertion site: oral  Blade: Franklin  Blade size: 3  ETT size (mm): 7.5  Cormack-Lehane Classification: grade I - full view of glottis  Placement verified by: chest auscultation and capnometry   Cuff volume (mL): 8  Measured from: lips  ETT/EBT  to lips (cm): 22  Number of attempts at approach: 1  Assessment: lips, teeth, and gum same as pre-op and atraumatic intubation    Additional Comments  Pt preoxygenated prior to induction, easy mask airway, atraumatic intubation,+ ETCO2, + bs bilat,  ETT secured and connected to ventilator.

## 2024-06-01 NOTE — NURSING NOTE
Dr Bray at bedside. Discussion with patient about being discharged today. OK to discharge this afternoon if clear liquid diet is tolerated and will stay on a clear liquid diet.

## 2024-06-01 NOTE — PLAN OF CARE
Problem: Adult Inpatient Plan of Care  Goal: Absence of Hospital-Acquired Illness or Injury  Intervention: Prevent Skin Injury  Recent Flowsheet Documentation  Taken 6/1/2024 0426 by Brooklynn Meeks RN  Body Position: position changed independently  Taken 6/1/2024 0200 by Brooklynn Meeks RN  Body Position: position changed independently  Taken 6/1/2024 0000 by Brooklynn Meeks RN  Body Position: position changed independently  Taken 5/31/2024 2200 by Brooklynn Meeks RN  Body Position: position changed independently  Taken 5/31/2024 2001 by Brooklynn Meeks RN  Body Position: position changed independently  Skin Protection:   adhesive use limited   transparent dressing maintained   tubing/devices free from skin contact     Problem: Adult Inpatient Plan of Care  Goal: Optimal Comfort and Wellbeing  Intervention: Monitor Pain and Promote Comfort  Recent Flowsheet Documentation  Taken 5/31/2024 2001 by Brooklynn Meeks RN  Pain Management Interventions:   see MAR   breathing exercises   pillow support provided   Goal Outcome Evaluation:  Plan of Care Reviewed With: patient        Progress: improving  Outcome Evaluation: Pain med given during the night. IVF is infusing . Incision site clean and intact. CIWA 0 during this shift. Pt rested. Will cont to monitor.

## 2024-06-01 NOTE — ANESTHESIA PREPROCEDURE EVALUATION
Anesthesia Evaluation     Patient summary reviewed and Nursing notes reviewed   NPO Solid Status: > 8 hours  NPO Liquid Status: > 8 hours           Airway   Dental          Pulmonary - negative pulmonary ROS   Cardiovascular - negative cardio ROS  Exercise tolerance: unable to assess    ECG reviewed        Neuro/Psych- negative ROS  GI/Hepatic/Renal/Endo - negative ROS     Musculoskeletal (-) negative ROS    Abdominal    Substance History - negative use     OB/GYN negative ob/gyn ROS         Other - negative ROS       ROS/Med Hx Other: ASSESSMENT:  -Choledocholithiasis -evidenced on IOC  -Epigastric pain  -Nausea  -Acute calculus cholecystitis s/p cholecystectomy with IOC today  -Dyspepsia  -Dysphagia  -Mildly elevated LFTs     PLAN:  Patient is a 58-year-old male with no significant previous medical history who presented on 5/31 with complaints of chest/epigastric pain.  CT abdomen/pelvis W showed mild distention of the gallbladder and mild biliary dilation with questionable gallbladder wall thickening.  Abdominal US shows findings concerning for acute calculus cholecystitis.  The patient was taken for cholecystectomy with IOC today.  Intraoperative cholangiogram did show choledocholithiasis.     Total bilirubin 0.4, alk phos 72, AST 24, ALT 47.  Lipase normal.  WBC count normal.  Will proceed with ERCP today.  Maintain NPO.  Antiemetics/analgesics as needed.  Start PPI.  Agree with antibiotics.  Likely home tomorrow from GI standpoint.                  Anesthesia Plan    ASA 2     general     (Had Lap Jannet Yeesterday)  intravenous induction     Anesthetic plan, risks, benefits, and alternatives have been provided, discussed and informed consent has been obtained with: patient.    CODE STATUS:    Code Status (Patient has no pulse and is not breathing): CPR (Attempt to Resuscitate)  Medical Interventions (Patient has pulse or is breathing): Full Support

## 2024-06-01 NOTE — OP NOTE
ENDOSCOPIC RETROGRADE CHOLANGIOPANCREATOGRAPHY Procedure Report    Patient Name:  Virgilio Swain  YOB: 1965    Date of Surgery:  6/1/2024     Pre-Op Diagnosis:  Choledocholithiasis [K80.50]  Positive intraoperative cholangiogram    Post Op Diagnosis:  Choledocholithiasis    Procedure/CPT® Codes:      Procedure(s):  ENDOSCOPIC RETROGRADE CHOLANGIOPANCREATOGRAPHY, SPHINTERTOMY, CLEARANCE OF BILE DUCT WITH BALLOON (9MM-12MM, UP TO 12MM), REMOVAL OF BILIARY STONE WITH BALLOON, AND INTRAOPERATIVE CHOLANGIOGRAM    Staff:  Surgeon(s):  Esthela Maldonado MD         Anesthesia: General  Indocin suppository  100mg    Implants:    Nothing was implanted during the procedure    No blood loss    Specimen:        See Below    Complications:  No immediate post-operative complications    Description of Procedure:  Informed consent was obtained for the procedure, including sedation.  Risks of perforation, hemorrhage, adverse drug reaction and aspiration and pancreatitis were discussed.    The patient was brought into the endoscopy suite. Continuous cardiopulmonary monitoring was performed.  After general anesthesia was administered and endotracheal intubation was achieved, the bite block was inserted into the patient's mouth. The patient was placed in the right semiprone position on the fluoroscopy table. Indocin suppository was inserted into the patient's rectum for prophylaxis.   A  film was obtained which showed normal anatomy.  The duodenoscope was inserted into the patient's mouth and advanced to the second portion of the duodenum without difficulty.   Limited examination of the patient's esophagus, stomach, and duodenum were performed and were normal.  The duodenoscope was brought into the short position with the major papilla in direct visualization, and it appeared normal with free flow of bile out of the biliary os.  The JagTome Rx pappillotome was inserted in the biliary os.  The hydrophilc tip  wire was inserted into the distal common bile duct with some difficulty.  Ampullary stenosis was noted and sharp angulation of the distal common bile duct requiring the papillotome to be in abode position to successfully cannulate the common bile duct.  The catheter was advanced over the wire, bile was aspirated, and a cholangiogram was obtained.  The cholangiogram showed 12 mm extrahepatic bile duct with normal appearance to the intrahepatic bile ducts.  There was an abrupt cut off at the distal common bile duct.  Biliary sphincterotomy was performed in the 12 o'clock position.  Subsequently the Jagtome was removed the patient and the 9 to 12 mm extractor X balloon catheter was inserted to the mid bile duct.  Balloon sweeps were performed with extraction of cholesterol stone debris, however I still since resistance of the distal common bile duct and felt that additional stone debris was present.  I therefore remove the extractor Rx papillotome and reinserted the Jagtome.  I extended the biliary sphincterotomy and then repeated balloon sweeps with successful extraction of cholesterol stone and additional debris.  Subsequent balloon sweeps were negative for any additional debris.  Final occlusion cholangiogram was negative for any filling defects.      On completion of the exam, the bowel was decompressed, the scope was removed from the patient, the patient tolerated the procedure well, there were no immediate post-operative complications. The pancreatic duct was not investigated since it was not the duct of interest.        Impression:  Choledocholithiasis status post ERCP with biliary sphincterotomy and extraction of CBD stone and biliary stone debris.    Recommendations:  No aspirin NSAIDs or blood thinners for 7 days due to biliary sphincterotomy  Clear liquid diet today  Anticipate discharge in a.m. if no complications      Esthela Maldonado MD     Date: 6/1/2024  Time: 11:59 EDT

## 2024-06-02 ENCOUNTER — INPATIENT HOSPITAL (OUTPATIENT)
Dept: URBAN - METROPOLITAN AREA HOSPITAL 84 | Facility: HOSPITAL | Age: 59
End: 2024-06-02

## 2024-06-02 VITALS
DIASTOLIC BLOOD PRESSURE: 90 MMHG | TEMPERATURE: 97.8 F | HEIGHT: 72 IN | BODY MASS INDEX: 28.44 KG/M2 | HEART RATE: 63 BPM | WEIGHT: 210 LBS | OXYGEN SATURATION: 98 % | SYSTOLIC BLOOD PRESSURE: 138 MMHG | RESPIRATION RATE: 18 BRPM

## 2024-06-02 DIAGNOSIS — K80.50 CALCULUS OF BILE DUCT WITHOUT CHOLANGITIS OR CHOLECYSTITIS W: ICD-10-CM

## 2024-06-02 LAB
ALBUMIN SERPL-MCNC: 3.9 G/DL (ref 3.5–5.2)
ALBUMIN/GLOB SERPL: 1.7 G/DL
ALP SERPL-CCNC: 114 U/L (ref 39–117)
ALT SERPL W P-5'-P-CCNC: 263 U/L (ref 1–41)
AMYLASE SERPL-CCNC: 60 U/L (ref 28–100)
ANION GAP SERPL CALCULATED.3IONS-SCNC: 8.6 MMOL/L (ref 5–15)
AST SERPL-CCNC: 371 U/L (ref 1–40)
BASOPHILS # BLD AUTO: 0.01 10*3/MM3 (ref 0–0.2)
BASOPHILS NFR BLD AUTO: 0.1 % (ref 0–1.5)
BILIRUB SERPL-MCNC: 2.1 MG/DL (ref 0–1.2)
BUN SERPL-MCNC: 11 MG/DL (ref 6–20)
BUN/CREAT SERPL: 11.1 (ref 7–25)
CALCIUM SPEC-SCNC: 9.1 MG/DL (ref 8.6–10.5)
CHLORIDE SERPL-SCNC: 105 MMOL/L (ref 98–107)
CO2 SERPL-SCNC: 26.4 MMOL/L (ref 22–29)
CREAT SERPL-MCNC: 0.99 MG/DL (ref 0.76–1.27)
DEPRECATED RDW RBC AUTO: 43.5 FL (ref 37–54)
EGFRCR SERPLBLD CKD-EPI 2021: 88.9 ML/MIN/1.73
EOSINOPHIL # BLD AUTO: 0.01 10*3/MM3 (ref 0–0.4)
EOSINOPHIL NFR BLD AUTO: 0.1 % (ref 0.3–6.2)
ERYTHROCYTE [DISTWIDTH] IN BLOOD BY AUTOMATED COUNT: 13.7 % (ref 12.3–15.4)
GLOBULIN UR ELPH-MCNC: 2.3 GM/DL
GLUCOSE SERPL-MCNC: 114 MG/DL (ref 65–99)
HCT VFR BLD AUTO: 40.2 % (ref 37.5–51)
HGB BLD-MCNC: 12.9 G/DL (ref 13–17.7)
IMM GRANULOCYTES # BLD AUTO: 0.05 10*3/MM3 (ref 0–0.05)
IMM GRANULOCYTES NFR BLD AUTO: 0.5 % (ref 0–0.5)
LIPASE SERPL-CCNC: 24 U/L (ref 13–60)
LYMPHOCYTES # BLD AUTO: 1.7 10*3/MM3 (ref 0.7–3.1)
LYMPHOCYTES NFR BLD AUTO: 15.7 % (ref 19.6–45.3)
MCH RBC QN AUTO: 27.7 PG (ref 26.6–33)
MCHC RBC AUTO-ENTMCNC: 32.1 G/DL (ref 31.5–35.7)
MCV RBC AUTO: 86.5 FL (ref 79–97)
MONOCYTES # BLD AUTO: 0.8 10*3/MM3 (ref 0.1–0.9)
MONOCYTES NFR BLD AUTO: 7.4 % (ref 5–12)
NEUTROPHILS NFR BLD AUTO: 76.2 % (ref 42.7–76)
NEUTROPHILS NFR BLD AUTO: 8.25 10*3/MM3 (ref 1.7–7)
NRBC BLD AUTO-RTO: 0 /100 WBC (ref 0–0.2)
PLATELET # BLD AUTO: 214 10*3/MM3 (ref 140–450)
PMV BLD AUTO: 11.3 FL (ref 6–12)
POTASSIUM SERPL-SCNC: 4.5 MMOL/L (ref 3.5–5.2)
PROT SERPL-MCNC: 6.2 G/DL (ref 6–8.5)
RBC # BLD AUTO: 4.65 10*6/MM3 (ref 4.14–5.8)
SODIUM SERPL-SCNC: 140 MMOL/L (ref 136–145)
WBC NRBC COR # BLD AUTO: 10.82 10*3/MM3 (ref 3.4–10.8)

## 2024-06-02 PROCEDURE — 99231 SBSQ HOSP IP/OBS SF/LOW 25: CPT | Performed by: NURSE PRACTITIONER

## 2024-06-02 PROCEDURE — 99024 POSTOP FOLLOW-UP VISIT: CPT

## 2024-06-02 PROCEDURE — 85025 COMPLETE CBC W/AUTO DIFF WBC: CPT | Performed by: INTERNAL MEDICINE

## 2024-06-02 PROCEDURE — G0378 HOSPITAL OBSERVATION PER HR: HCPCS

## 2024-06-02 PROCEDURE — 83690 ASSAY OF LIPASE: CPT | Performed by: INTERNAL MEDICINE

## 2024-06-02 PROCEDURE — 80053 COMPREHEN METABOLIC PANEL: CPT | Performed by: INTERNAL MEDICINE

## 2024-06-02 PROCEDURE — 25810000003 LACTATED RINGERS PER 1000 ML: Performed by: INTERNAL MEDICINE

## 2024-06-02 PROCEDURE — 82150 ASSAY OF AMYLASE: CPT | Performed by: INTERNAL MEDICINE

## 2024-06-02 PROCEDURE — 25010000002 THIAMINE PER 100 MG: Performed by: INTERNAL MEDICINE

## 2024-06-02 RX ORDER — HYDROCODONE BITARTRATE AND ACETAMINOPHEN 5; 325 MG/1; MG/1
1 TABLET ORAL EVERY 4 HOURS PRN
Qty: 6 TABLET | Refills: 0 | Status: SHIPPED | OUTPATIENT
Start: 2024-06-02 | End: 2024-06-03

## 2024-06-02 RX ORDER — ONDANSETRON 4 MG/1
4 TABLET, ORALLY DISINTEGRATING ORAL EVERY 8 HOURS PRN
Qty: 6 TABLET | Refills: 0 | Status: SHIPPED | OUTPATIENT
Start: 2024-06-02

## 2024-06-02 RX ADMIN — Medication 10 ML: at 12:22

## 2024-06-02 RX ADMIN — THIAMINE HYDROCHLORIDE 200 MG: 100 INJECTION, SOLUTION INTRAMUSCULAR; INTRAVENOUS at 05:32

## 2024-06-02 RX ADMIN — HYDROCODONE BITARTRATE AND ACETAMINOPHEN 1 TABLET: 5; 325 TABLET ORAL at 07:35

## 2024-06-02 RX ADMIN — HYDROCODONE BITARTRATE AND ACETAMINOPHEN 1 TABLET: 5; 325 TABLET ORAL at 12:21

## 2024-06-02 RX ADMIN — SODIUM CHLORIDE, POTASSIUM CHLORIDE, SODIUM LACTATE AND CALCIUM CHLORIDE 150 ML/HR: 600; 310; 30; 20 INJECTION, SOLUTION INTRAVENOUS at 05:36

## 2024-06-02 RX ADMIN — LORAZEPAM 1 MG: 1 TABLET ORAL at 05:32

## 2024-06-02 RX ADMIN — PANTOPRAZOLE SODIUM 40 MG: 40 INJECTION, POWDER, FOR SOLUTION INTRAVENOUS at 07:35

## 2024-06-02 RX ADMIN — FOLIC ACID 1 MG: 1 TABLET ORAL at 12:20

## 2024-06-02 RX ADMIN — LORAZEPAM 1 MG: 1 TABLET ORAL at 12:21

## 2024-06-02 NOTE — PROGRESS NOTES
" LOS: 0 days   Patient Care Team:  Favian Moore MD as PCP - General (Family Medicine)      Subjective   \" I am ready to get out of here and go to [place]\"    Interval History:   6/1/2024 ERCP (Dr. Maldonado) biliary sphincterotomy and extraction of CBD stone and biliary stone debris  LABS: Sodium potassium creatinine all normal.  TB 2.1 (1), alk phos 114,  (36),  (62).  Amylase 60, lipase 24.  WBCs 10.82 (12.24), hemoglobin 12.9, platelets 214.  Resting comfortably in bed.  Significant other at bedside.  Tolerating diet.  Patient is passing gas but has not had a bowel movement yet.  Had some reflux around midnight last night took Maalox which resolved his symptoms.    ROS:   Abdominal soreness  No chest pain, shortness of breath, or cough.         Medication Review:     Current Facility-Administered Medications:     acetaminophen (TYLENOL) tablet 650 mg, 650 mg, Oral, Q4H PRN **OR** acetaminophen (TYLENOL) suppository 650 mg, 650 mg, Rectal, Q4H PRN, Esthela Maldonado MD    aluminum-magnesium hydroxide-simethicone (MAALOX MAX) 400-400-40 MG/5ML suspension 15 mL, 15 mL, Oral, Q6H PRN, Esthela Maldonado MD, 15 mL at 06/01/24 1906    folic acid (FOLVITE) tablet 1 mg, 1 mg, Oral, Daily, Esthela Maldonado MD, 1 mg at 06/02/24 1220    HYDROcodone-acetaminophen (NORCO) 5-325 MG per tablet 1 tablet, 1 tablet, Oral, Q4H PRN, Esthela Maldonado MD, 1 tablet at 06/02/24 1221    HYDROmorphone (DILAUDID) injection 0.5 mg, 0.5 mg, Intravenous, Q2H PRN, 0.5 mg at 06/01/24 2056 **AND** naloxone (NARCAN) injection 0.1 mg, 0.1 mg, Intravenous, Q5 Min PRN, Esthela Maldonado MD    lactated ringers infusion, 150 mL/hr, Intravenous, Continuous, Esthela Maldonado MD, Last Rate: 150 mL/hr at 06/02/24 1221, 150 mL/hr at 06/02/24 1221    LORazepam (ATIVAN) tablet 1 mg, 1 mg, Oral, Q1H PRN **OR** LORazepam (ATIVAN) injection 1 mg, 1 mg, Intravenous, Q1H PRN **OR** LORazepam (ATIVAN) tablet 2 mg, 2 mg, " Oral, Q1H PRN **OR** LORazepam (ATIVAN) injection 2 mg, 2 mg, Intravenous, Q1H PRN **OR** LORazepam (ATIVAN) injection 2 mg, 2 mg, Intravenous, Q15 Min PRN **OR** LORazepam (ATIVAN) injection 2 mg, 2 mg, Intramuscular, Q15 Min PRN, Esthela Maldonado MD    [] LORazepam (ATIVAN) tablet 2 mg, 2 mg, Oral, Q6H, 2 mg at 24 0520 **FOLLOWED BY** [COMPLETED] LORazepam (ATIVAN) tablet 1 mg, 1 mg, Oral, Q6H, 1 mg at 24 1221 **FOLLOWED BY** LORazepam (ATIVAN) tablet 1 mg, 1 mg, Oral, Q12H **FOLLOWED BY** [START ON 2024] LORazepam (ATIVAN) tablet 1 mg, 1 mg, Oral, Daily, Esthela Maldonado MD    Magnesium Standard Dose Replacement - Follow Nurse / BPA Driven Protocol, , Does not apply, PRN, Esthela Maldonado MD    niCARdipine (CARDENE) 25mg in 250mL NS infusion, 5-15 mg/hr, Intravenous, Continuous PRN, Esthela Maldonado MD    nitroglycerin (NITROSTAT) SL tablet 0.4 mg, 0.4 mg, Sublingual, Q5 Min PRN, Esthela Maldonado MD    ondansetron ODT (ZOFRAN-ODT) disintegrating tablet 4 mg, 4 mg, Oral, Q6H PRN **OR** ondansetron (ZOFRAN) injection 4 mg, 4 mg, Intravenous, Q6H PRN, Esthela Maldonado MD    ondansetron ODT (ZOFRAN-ODT) disintegrating tablet 4 mg, 4 mg, Oral, Q6H PRN **OR** ondansetron (ZOFRAN) injection 4 mg, 4 mg, Intravenous, Q6H PRN, Esthela Maldonado MD    pantoprazole (PROTONIX) injection 40 mg, 40 mg, Intravenous, BID AC, Esthela Maldonado MD, 40 mg at 24 0735    sodium chloride 0.9 % flush 10 mL, 10 mL, Intravenous, Q12H, Esthela Maldonado MD, 10 mL at 24 1222    sodium chloride 0.9 % flush 10 mL, 10 mL, Intravenous, PRN, Esthela Maldonado MD    sodium chloride 0.9 % infusion 40 mL, 40 mL, Intravenous, PRN, Esthela Maldonado MD    sodium chloride 0.9 % infusion, 30 mL/hr, Intravenous, Continuous PRN, Esthela Maldonado MD    thiamine (B-1) injection 200 mg, 200 mg, Intravenous, Q8H, 200 mg at 24 0532 **FOLLOWED BY** [START ON  6/5/2024] thiamine (VITAMIN B-1) tablet 100 mg, 100 mg, Oral, Daily, Esthela Maldonado MD      Objective resting in the hospital bed.  Eating a sandwich.  Nurse at bedside.  NAD.  Family at bedside    Vital Signs  Temp:  [97.6 °F (36.4 °C)-97.9 °F (36.6 °C)] 97.8 °F (36.6 °C)  Heart Rate:  [60-74] 63  Resp:  [13-21] 18  BP: (111-146)/(59-90) 138/90  Physical Exam:    General Appearance:    Awake and alert, in no acute distress   Head:    Normocephalic, without obvious abnormality   Eyes:          Conjunctivae normal, anicteric sclerae   Ears:    Hearing intact   Throat:   No oral lesions, no thrush, oral mucosa moist   Neck:   No adenopathy, supple, no JVD   Lungs:      respirations regular, even and unlabored        Abdomen:   Steri-Strips  in place clean and dry. Soft, non-tender, no rebound or guarding, non-distended, no hepatosplenomegaly   Rectal:     Deferred   Extremities:   No edema, no cyanosis, no redness   Skin:   No bleeding, bruising or rash, no jaundice   Neurologic:   Cranial nerves 2 - 12 grossly intact, no asterixis, sensation   intact        Results Review:    CBC    Results from last 7 days   Lab Units 06/02/24  0248 06/01/24  0542 05/31/24  0512   WBC 10*3/mm3 10.82* 12.24* 10.78   HEMOGLOBIN g/dL 12.9* 13.5 14.4   PLATELETS 10*3/mm3 214 201 240     CMP   Results from last 7 days   Lab Units 06/02/24  0248 06/01/24  0542 05/31/24  0512   SODIUM mmol/L 140 140 140   POTASSIUM mmol/L 4.5 4.1 4.1   CHLORIDE mmol/L 105 104 105   CO2 mmol/L 26.4 27.7 22.9   BUN mg/dL 11 11 15   CREATININE mg/dL 0.99 1.04 1.13   GLUCOSE mg/dL 114* 113* 117*   ALBUMIN g/dL 3.9 4.0 4.4   BILIRUBIN mg/dL 2.1* 1.0 0.4   ALK PHOS U/L 114 65 72   AST (SGOT) U/L 371* 36 24   ALT (SGPT) U/L 263* 62* 47*   AMYLASE U/L 60  --   --    LIPASE U/L 24 32 36     Cr Clearance Estimated Creatinine Clearance: 98.6 mL/min (by C-G formula based on SCr of 0.99 mg/dL).  Coag   Results from last 7 days   Lab Units 06/01/24  0501  "05/31/24  0512   INR  1.05 1.00   APTT seconds  --  25.2*     HbA1C No results found for: \"HGBA1C\"  Blood Glucose No results found for: \"POCGLU\"  Infection     UA      Radiology(recent) No radiology results for the last day          Assessment & Plan   -Choledocholithiasis -evidenced on IOC  -Epigastric pain  -Nausea  -Acute calculus cholecystitis s/p cholecystectomy with IOC today  -Dyspepsia  -Dysphagia  -Mildly elevated LFTs  -Alcohol abuse    6/1/2024 ERCP (Dr. Maldonado) biliary sphincterotomy and extraction of CBD stone and biliary stone debris    PLAN:  Patient is a 58-year-old male with no significant previous medical history who presented on 5/31 with complaints of chest/epigastric pain.  CT abdomen/pelvis W showed mild distention of the gallbladder and mild biliary dilation with questionable gallbladder wall thickening.  Abdominal US shows findings concerning for acute calculus cholecystitis.  The patient was taken for cholecystectomy with IOC today.  Intraoperative cholangiogram did show choledocholithiasis.    Patient underwent ERCP yesterday.  LFTs are elevated today.  Amylase and lipase are normal.  Patient is feeling well.  Tolerating his diet.  Discussed that he will need CMP in 1 week.  Order will be sent to [facility].  Okay for discharge from GI standpoint.  Recommend complete alcohol cessation    Emilie Perez, BRITTNEY  06/02/24  12:38 EDT            "

## 2024-06-02 NOTE — PLAN OF CARE
Goal Outcome Evaluation:  Plan of Care Reviewed With: patient, spouse        Progress: improving  Outcome Evaluation: Pt tolerated clear liquid well during shift. Awaiting advance diet today. pt rested  with minimal complaints of pain. meds given. will cont to monitor.

## 2024-06-02 NOTE — PROGRESS NOTES
General Surgery Progress Note    Name: Virgilio Swain ADMIT: 2024   : 1965  PCP: Favian Moore MD    MRN: [id number] LOS: 0 days   AGE/SEX: 58 y.o. male  ROOM: [address] [place] [address]    Chief Complaint   Patient presents with    Chest Pain     Subjective     Patient seen and examined.  Vital signs stable, afebrile.  Overall doing okay, no new complaints this morning.  Pain controlled with PRN medications.  Tolerated breakfast this morning, denies nausea and vomiting.  Passing flatus, no BM.  White count improving, 10.82 this morning.  Slight bump in LFTs.  Ambulates and lipase normal.    Objective     Scheduled Medications:   folic acid, 1 mg, Oral, Daily  LORazepam, 1 mg, Oral, Q6H   Followed by  LORazepam, 1 mg, Oral, Q12H   Followed by  [START ON 2024] LORazepam, 1 mg, Oral, Daily  pantoprazole, 40 mg, Intravenous, BID AC  sodium chloride, 10 mL, Intravenous, Q12H  thiamine (B-1) IV, 200 mg, Intravenous, Q8H   Followed by  [START ON 2024] thiamine, 100 mg, Oral, Daily        Active Infusions:  lactated ringers, 150 mL/hr, Last Rate: 150 mL/hr (24 0536)  niCARdipine, 5-15 mg/hr  sodium chloride, 30 mL/hr        As Needed Medications:    acetaminophen **OR** acetaminophen    aluminum-magnesium hydroxide-simethicone    HYDROcodone-acetaminophen    HYDROmorphone **AND** naloxone    LORazepam **OR** LORazepam **OR** LORazepam **OR** LORazepam **OR** LORazepam **OR** LORazepam    Magnesium Standard Dose Replacement - Follow Nurse / BPA Driven Protocol    niCARdipine    nitroglycerin    ondansetron ODT **OR** ondansetron    ondansetron ODT **OR** ondansetron    sodium chloride    sodium chloride    sodium chloride    Vital Signs  Vital Signs Patient Vitals for the past 24 hrs:   BP Temp Temp src Pulse Resp SpO2   24 0622 122/59 97.9 °F (36.6 °C) Oral 62 18 97 %   24 0241 127/69 97.8 °F (36.6 °C) Oral 60 15 93 %   24 2238 111/68 97.8 °F (36.6 °C) Oral 68 18 90 %   24 1435  146/89 97.8 °F (36.6 °C) Oral -- 21 --   06/01/24 1250 129/75 97.6 °F (36.4 °C) -- 74 13 95 %   06/01/24 1234 132/84 -- -- 73 13 94 %   06/01/24 1220 131/80 -- -- 70 10 94 %   06/01/24 1215 138/78 -- -- 66 12 97 %   06/01/24 1210 134/77 -- -- 64 16 99 %   06/01/24 1205 145/80 97.4 °F (36.3 °C) -- 67 17 98 %     I/O:  I/O last 3 completed shifts:  In: 3100 [I.V.:3000; IV Piggyback:100]  Out: -     Physical Exam:  Physical Exam  Constitutional:       General: He is not in acute distress.  Cardiovascular:      Rate and Rhythm: Normal rate.   Pulmonary:      Effort: Pulmonary effort is normal. No respiratory distress.   Abdominal:      Palpations: Abdomen is soft.      Comments: Soft, minimal distention, appropriate tenderness.  Incisions clean dry and intact.   Neurological:      Mental Status: He is alert.         Results Review:     CBC    Results from last 7 days   Lab Units 06/02/24  0248 06/01/24  0542 05/31/24  0512   WBC 10*3/mm3 10.82* 12.24* 10.78   HEMOGLOBIN g/dL 12.9* 13.5 14.4   PLATELETS 10*3/mm3 214 201 240     BMP   Results from last 7 days   Lab Units 06/02/24  0248 06/01/24  0542 05/31/24  0512   SODIUM mmol/L 140 140 140   POTASSIUM mmol/L 4.5 4.1 4.1   CHLORIDE mmol/L 105 104 105   CO2 mmol/L 26.4 27.7 22.9   BUN mg/dL 11 11 15   CREATININE mg/dL 0.99 1.04 1.13   GLUCOSE mg/dL 114* 113* 117*     Radiology(recent) [place] Cholangiogram Operative    Result Date: 5/31/2024  Impression: Intraoperative cholangiogram with fluoroscopy. Please refer to the procedure report for findings and recommendations Electronically Signed: Radha Jones MD  5/31/2024 12:00 PM EDT  Workstation ID: [id number]     I reviewed the patient's new clinical results.    Assessment & Plan       Cholecystitis    Choledocholithiasis      58 y.o. male POD 2 status post robotic cholecystectomy, now status post ERCP 6/1/2024     -Diet as tolerated  -Antiemetics and pain medication as needed  -Okay to discharge from general surgery  perspective when okay with GI team.  He is to follow-up with Dr. Holloway in office in 2 weeks.      This note was created using Dragon Voice Recognition software.    LALITO Champagne  06/02/24  09:56 EDT

## 2024-06-02 NOTE — DISCHARGE SUMMARY
[facility]    Favian Moore MD    CHIEF COMPLAINT:     Epigastric pain    HISTORY OF PRESENT ILLNESS:    \A [facility] [place] [facility]\""    ED  58-year-old male with a history of pancreatitis diagnosed in November 2023 presents with suspected recurrence with severe epigastric pain with nausea and vomiting onset at midnight. Patient states he has chest pain but then points to his epigastrium. Patient admits to alcohol abuse yesterday. Patient was given aspirin, Zofran, fentanyl and route per EMS.      Observation 5/31/24  General surgery and GI consulted. Pt having cholecystectomy today.      Observation 6/1/24  Gi following. ERCP today. Clear liquid diet. Home tomorrow    History reviewed. No pertinent past medical history.  History reviewed. No pertinent surgical history.  History reviewed. No pertinent family history.  Social History     Tobacco Use    Smoking status: Never    Smokeless tobacco: Never   Vaping Use    Vaping status: Never Used   Substance Use Topics    Alcohol use: Not Currently    Drug use: Defer     No medications prior to admission.     Allergies:  Patient has no known allergies.      There is no immunization history on file for this patient.        REVIEW OF SYSTEMS:    ROS    Cardiovascular:  Positive for chest pain.   Gastrointestinal:  Positive for abdominal pain, nausea and vomiting.      Vital Signs  Temp:  [97.6 °F (36.4 °C)-97.9 °F (36.6 °C)] 97.8 °F (36.6 °C)  Heart Rate:  [60-74] 63  Resp:  [13-21] 18  BP: (111-146)/(59-90) 138/90          Physical Exam:  Physical Exam  Constitutional:       Appearance: Normal appearance.   Cardiovascular:      Rate and Rhythm: Normal rate and regular rhythm.      Pulses: Normal pulses.      Heart sounds: Normal heart sounds.   Pulmonary:      Effort: Pulmonary effort is normal.      Breath sounds: Normal breath sounds.   Neurological:      General: No focal deficit present.      Mental Status: He is alert and oriented to person, place, and time.    Psychiatric:         Mood and Affect: Mood normal.         Behavior: Behavior normal.     Emotional Behavior:    wnl   Debilities:   None      Results Review:    I reviewed the patient's new clinical results.  Lab Results (most recent)       Procedure Component Value Units Date/Time    Comprehensive Metabolic Panel [[id number]]  (Abnormal) Collected: 06/02/24 0248    Specimen: Blood Updated: 06/02/24 0343     Glucose 114 mg/dL      BUN 11 mg/dL      Creatinine 0.99 mg/dL      Sodium 140 mmol/L      Potassium 4.5 mmol/L      Chloride 105 mmol/L      CO2 26.4 mmol/L      Calcium 9.1 mg/dL      Total Protein 6.2 g/dL      Albumin 3.9 g/dL      ALT (SGPT) 263 U/L      AST (SGOT) 371 U/L      Alkaline Phosphatase 114 U/L      Total Bilirubin 2.1 mg/dL      Globulin 2.3 gm/dL      A/G Ratio 1.7 g/dL      BUN/Creatinine Ratio 11.1     Anion Gap 8.6 mmol/L      eGFR 88.9 mL/min/1.73     Narrative:      GFR Normal >60  Chronic Kidney Disease <60  Kidney Failure <15      Amylase [[id number]]  (Normal) Collected: 06/02/24 0248    Specimen: Blood Updated: 06/02/24 0342     Amylase 60 U/L     Lipase [[id number]]  (Normal) Collected: 06/02/24 0248    Specimen: Blood Updated: 06/02/24 0342     Lipase 24 U/L     CBC & Differential [[id number]]  (Abnormal) Collected: 06/02/24 0248    Specimen: Blood Updated: 06/02/24 0323    Narrative:      The following orders were created for panel order CBC & Differential.  Procedure                               Abnormality         Status                     ---------                               -----------         ------                     CBC Auto Differential[[id number]]        Abnormal            Final result                 Please view results for these tests on the individual orders.    CBC Auto Differential [[id number]]  (Abnormal) Collected: 06/02/24 0248    Specimen: Blood Updated: 06/02/24 0323     WBC 10.82 10*3/mm3      RBC 4.65 10*6/mm3      Hemoglobin 12.9 g/dL      Hematocrit 40.2  %      MCV 86.5 fL      MCH 27.7 pg      MCHC 32.1 g/dL      RDW 13.7 %      RDW-SD 43.5 fl      MPV 11.3 fL      Platelets 214 10*3/mm3      Neutrophil % 76.2 %      Lymphocyte % 15.7 %      Monocyte % 7.4 %      Eosinophil % 0.1 %      Basophil % 0.1 %      Immature Grans % 0.5 %      Neutrophils, Absolute 8.25 10*3/mm3      Lymphocytes, Absolute 1.70 10*3/mm3      Monocytes, Absolute 0.80 10*3/mm3      Eosinophils, Absolute 0.01 10*3/mm3      Basophils, Absolute 0.01 10*3/mm3      Immature Grans, Absolute 0.05 10*3/mm3      nRBC 0.0 /100 WBC     Comprehensive Metabolic Panel [[id number]]  (Abnormal) Collected: 06/01/24 0542    Specimen: Blood Updated: 06/01/24 0617     Glucose 113 mg/dL      BUN 11 mg/dL      Creatinine 1.04 mg/dL      Sodium 140 mmol/L      Potassium 4.1 mmol/L      Chloride 104 mmol/L      CO2 27.7 mmol/L      Calcium 9.2 mg/dL      Total Protein 6.4 g/dL      Albumin 4.0 g/dL      ALT (SGPT) 62 U/L      AST (SGOT) 36 U/L      Alkaline Phosphatase 65 U/L      Total Bilirubin 1.0 mg/dL      Globulin 2.4 gm/dL      A/G Ratio 1.7 g/dL      BUN/Creatinine Ratio 10.6     Anion Gap 8.3 mmol/L      eGFR 83.7 mL/min/1.73     Narrative:      GFR Normal >60  Chronic Kidney Disease <60  Kidney Failure <15      Lipase [[id number]]  (Normal) Collected: 06/01/24 0542    Specimen: Blood Updated: 06/01/24 0617     Lipase 32 U/L     Protime-INR [[id number]]  (Normal) Collected: 06/01/24 0542    Specimen: Blood Updated: 06/01/24 0608     Protime 11.4 Seconds      INR 1.05    CBC & Differential [[id number]]  (Abnormal) Collected: 06/01/24 0542    Specimen: Blood Updated: 06/01/24 0554    Narrative:      The following orders were created for panel order CBC & Differential.  Procedure                               Abnormality         Status                     ---------                               -----------         ------                     CBC Auto Differential[[id number]]        Abnormal            Final result                  Please view results for these tests on the individual orders.    CBC Auto Differential [[id number]]  (Abnormal) Collected: 06/01/24 0542    Specimen: Blood Updated: 06/01/24 0554     WBC 12.24 10*3/mm3      RBC 4.80 10*6/mm3      Hemoglobin 13.5 g/dL      Hematocrit 41.2 %      MCV 85.8 fL      MCH 28.1 pg      MCHC 32.8 g/dL      RDW 13.9 %      RDW-SD 43.7 fl      MPV 10.8 fL      Platelets 201 10*3/mm3      Neutrophil % 70.3 %      Lymphocyte % 21.4 %      Monocyte % 6.6 %      Eosinophil % 0.7 %      Basophil % 0.5 %      Immature Grans % 0.5 %      Neutrophils, Absolute 8.60 10*3/mm3      Lymphocytes, Absolute 2.62 10*3/mm3      Monocytes, Absolute 0.81 10*3/mm3      Eosinophils, Absolute 0.09 10*3/mm3      Basophils, Absolute 0.06 10*3/mm3      Immature Grans, Absolute 0.06 10*3/mm3      nRBC 0.0 /100 WBC     Tissue Pathology Exam [[id number]] Collected: 05/31/24 0959    Specimen: Tissue from Gallbladder Updated: 05/31/24 1336    High Sensitivity Troponin T 2Hr [[id number]] Collected: 05/31/24 0714    Specimen: Blood Updated: 05/31/24 0745     HS Troponin T <6 ng/L      Troponin T Delta --     Comment: Unable to calculate.       Narrative:      High Sensitive Troponin T Reference Range:  <14.0 ng/L- Negative Female for AMI  <22.0 ng/L- Negative Male for AMI  >=14 - Abnormal Female indicating possible myocardial injury.  >=22 - Abnormal Male indicating possible myocardial injury.   Clinicians would have to utilize clinical acumen, EKG, Troponin, and serial changes to determine if it is an Acute Myocardial Infarction or myocardial injury due to an underlying chronic condition.         High Sensitivity Troponin T [[id number]]  (Normal) Collected: 05/31/24 0512    Specimen: Blood Updated: 05/31/24 0541     HS Troponin T <6 ng/L     Narrative:      High Sensitive Troponin T Reference Range:  <14.0 ng/L- Negative Female for AMI  <22.0 ng/L- Negative Male for AMI  >=14 - Abnormal Female indicating possible  myocardial injury.  >=22 - Abnormal Male indicating possible myocardial injury.   Clinicians would have to utilize clinical acumen, EKG, Troponin, and serial changes to determine if it is an Acute Myocardial Infarction or myocardial injury due to an underlying chronic condition.         Ethanol [[id number]] Collected: 05/31/24 0512    Specimen: Blood Updated: 05/31/24 0541     Ethanol % <0.010 %     Narrative:      Plasma Ethanol Clinical Symptoms:    ETOH (%)               Clinical Symptom  .01-.05              No apparent influence  .03-.12              Euphoria, Diminished judgment and attention   .09-.25              Impaired comprehension, Muscle incoordination  .18-.30              Confusion, Staggered gait, Slurred speech  .25-.40              Markedly decreased response to stimuli, unable to stand or                        walk, vomitting, sleep or stupor  .35-.50              Comatose, Anesthesia, Subnormal body temperature        aPTT [[id number]]  (Abnormal) Collected: 05/31/24 0512    Specimen: Blood Updated: 05/31/24 0530     PTT 25.2 seconds     Protime-INR [[id number]]  (Normal) Collected: 05/31/24 0512    Specimen: Blood Updated: 05/31/24 0530     Protime 10.9 Seconds      INR 1.00            Imaging Results (Most Recent)       Procedure Component Value Units Date/Time    FL ERCP pancreatic and biliary ducts [[id number]] Resulted: 06/01/24 1200     Updated: 06/01/24 1201    FL Cholangiogram Operative [[id number]] Collected: 05/31/24 1158     Updated: 05/31/24 1202    Narrative:      FL CHOLANGIOGRAM OPERATIVE    Date of Exam: 5/31/2024 10:05 AM EDT    Indication: CHOLECYSTECTOMY LAPAROSCOPIC INTRAOPERATIVE CHOLANGIOGRAM WITH [employer]IN[employer] ROBOT.    Comparison: Right upper quadrant abdominal ultrasound 5/31/2024, CT abdomen pelvis 5/31/2024    Technique:  Digital spot images were obtained from an intraoperative cholangiogram procedure performed by the surgeon.    Fluoroscopic Time: 0.2 minutes    Number of  Images: 1 spot fluoroscopic series image    Findings:  Fluoroscopy was utilized during intraoperative cholangiogram. Contrast was injected retrograde into the cystic duct. There is extravasation of contrast along the free margin of the right hepatic lobe. There is good contrast opacification of the   downstream Intermatic bile ducts and the common bile duct. There small rounded filling defects within the CBD near the level of the ampulla suggestive of choledocholithiasis. Common bile duct appears mildly dilated.      Impression:      Impression:  Intraoperative cholangiogram with fluoroscopy. Please refer to the procedure report for findings and recommendations      Electronically Signed: Radha Jones MD    5/31/2024 12:00 PM EDT    Workstation ID: [id number]    US Abdomen Limited [[id number]] Collected: 05/31/24 0654     Updated: 05/31/24 0658    Narrative:      US ABDOMEN LIMITED    Date of Exam: 5/31/2024 6:22 AM EDT    Indication: epigastric pain.    Comparison: CT abdomen and pelvis 5/31/2024.    Technique: Grayscale and color Doppler ultrasound evaluation of the right upper quadrant was performed.      Findings:  Liver appears normal in size and echogenicity. There is no focal liver abnormality.  The portal vein and hepatic veins demonstrate normal directional flow and exhibit normal waveform on spectral imaging.    The gallbladder is distended measuring up to 13.2 cm. There is cholelithiasis. There is gallbladder wall thickening. No para cholecystic fluid. The common bile duct measures 6 mm diameter.    The right kidney measures 9.7 x 5.6 x 5.7 cm. Kidney echogenicity and vascularity appear within normal limits.  There is no solid kidney mass, echogenic shadowing stone or hydronephrosis.    Limited visualization of the pancreas and aorta is unremarkable.    There is no significant ascites within the right upper quadrant.      Impression:      Impression:  Findings are concerning for acute calculus  cholecystitis.        Electronically Signed: Idris Peterson MD    5/31/2024 6:55 AM EDT    Workstation ID: [id number]    CT Abdomen Pelvis With Contrast [[id number]] Collected: 05/31/24 0608     Updated: 05/31/24 0615    Narrative:      CT ABDOMEN PELVIS W CONTRAST    Date of Exam: 5/31/2024 5:51 AM EDT    Indication: severe epigastric pain, hx pancreatitis.    Comparison: 12/29/2023.    Technique: Axial CT images were obtained of the abdomen and pelvis following the uneventful intravenous administration of iodinated contrast. Sagittal and coronal reconstructions were performed.  Automated exposure control and iterative reconstruction   methods were used.        Findings:  Lung bases are clear. There is a large stomach containing hiatal hernia. Heart is normal size. No acute findings in the superficial soft tissues. No acute osseous abnormality or destructive bone lesion. There is mild thoracolumbar spondylosis.    The liver appears homogeneous. The gallbladder is mildly distended and there may be some gallbladder wall thickening. Common bile duct is mildly dilated measuring up to 9 mm. Mild intrahepatic biliary dilatation. The pancreas, distal stomach, spleen and   adrenal glands appear within normal limits. Kidneys appear normal. No urolithiasis or hydronephrosis. Prostate gland, urinary bladder and appendix appear normal. Colon is unremarkable. No small bowel distention. Abdominal and pelvic vasculature appear   within normal limits. There is no ascites, pneumoperitoneum or lymphadenopathy.      Impression:      Impression:  Mild distention of the gallbladder and mild biliary dilatation. Questionable gallbladder wall thickening. Correlate for symptoms of acute cholecystitis and consider ultrasound evaluation.            Electronically Signed: Idris Peterson MD    5/31/2024 6:12 AM EDT    Workstation ID: [id number]    XR Chest 1 View [[id number]] Collected: 05/31/24 0527     Updated: 05/31/24 0529    Narrative:      XR  CHEST 1 VW    Date of Exam: 5/31/2024 5:12 AM EDT    Indication: cp    Comparison: 11/23/2023.    Findings:  There is no pneumothorax, pleural effusion or focal airspace consolidation. Heart size and pulmonary vasculature appear within normal limits. Regional bones appear intact.      Impression:      Impression:  No acute cardiopulmonary abnormality.      Electronically Signed: Idris Peterson MD    5/31/2024 5:27 AM EDT    Workstation ID: [id number]          reviewed    ECG/EMG Results (most recent)       Procedure Component Value Units Date/Time    ECG 12 Lead Chest Pain [[id number]] Collected: 05/31/24 0503     Updated: 05/31/24 1057     QT Interval 397 ms      QTC Interval 384 ms     Narrative:      HEART RATE= 56  bpm  RR Interval= 1068  ms  CO Interval= 133  ms  P Horizontal Axis= 24  deg  P Front Axis= 22  deg  QRSD Interval= 89  ms  QT Interval= 397  ms  QTcB= 384  ms  QRS Axis= 17  deg  T Wave Axis= 31  deg  - ABNORMAL ECG -  Atrial-paced complexes  When compared with ECG of 23-Nov-2023 20:55:25,  Significant change in rhythm  Electronically Signed By: Subhash Macias (Channing) 31-May-2024 10:56:55  Date and Time of Study: 2024-05-31 05:03:48    Telemetry Scan [[id number]] Resulted: 05/31/24     Updated: 05/31/24 1131    Telemetry Scan [[id number]] Resulted: 05/31/24     Updated: 06/01/24 1154          reviewed            Microbiology Results (last 10 days)       ** No results found for the last 240 hours. **            Assessment & Plan     Cholecystitis    Choledocholithiasis     Cholecystitis/choledocholithiasis  - cbc, cmp, lipase, troponin, inr unremarkable  - chest xray is unremarkable  - ct abd showing mild distention of the gallbladder and mild biliary dilatation. Questionable gallbladder wall thickening.   - us abd reviewed and showing acute calculus cholecystitis  - EKG rate 56 pacs  - general surgery consulted  - cholecystectomy performed  - Gastroenterology consulted for choledocholithiasis  - ERCP - stone  extracted  - no asa, nsaids, blood thinners for 7 days  - clear liquid diet  - repeat cmp in 1 week     Etoh abuse  - pt is frequent drinker  - last drink was yesterday  - ethanol <.01    I discussed the patients findings and my recommendations with patient and nursing staff.     Discharge Diagnosis:      Cholecystitis    Choledocholithiasis      Hospital Course  Patient is a 58 y.o. male presented with epigastric pain. Er valuated and admitted to observation. Labs including cbc, cmp, lipase, troponin and inr were unremarkable on admission. LFT elevated on day of discharge and pt is to have outpt cmp performed in 1 week. Imaging revealed gallbladder distention and mild biliary dilation. General surgery was consulted and cholecystectomy performed. Gi was consulted for ERCP and stone was extracted. Pt has no complaints. Discharge discussed with pt and he is agreeable to plan. Instructed pt to return to er if symptoms reoccur or worsen.  .      Past Medical History:   History reviewed. No pertinent past medical history.    Past Surgical History:   History reviewed. No pertinent surgical history.    Social History:   Social History     Socioeconomic History    Marital status:    Tobacco Use    Smoking status: Never    Smokeless tobacco: Never   Vaping Use    Vaping status: Never Used   Substance and Sexual Activity    Alcohol use: Not Currently    Drug use: Defer    Sexual activity: Defer       Procedures Performed    Procedure(s):  CHOLECYSTECTOMY LAPAROSCOPIC INTRAOPERATIVE CHOLANGIOGRAM WITH [employer] ROBOT  -------------------    Procedure(s):  ENDOSCOPIC RETROGRADE CHOLANGIOPANCREATOGRAPHY, SPHINTERTOMY, CLEARANCE OF BILE DUCT WITH BALLOON (9MM-12MM, UP TO 12MM), REMOVAL OF BILIARY STONE WITH BALLOON, AND INTRAOPERATIVE CHOLANGIOGRAM  -------------------  06/01 1059 ERCP    Consults:   Consults       Date and Time Order Name Status Description    5/31/2024 12:47 PM Inpatient Gastroenterology Consult Completed      5/31/2024  7:06 AM Inpatient General Surgery Consult              Condition on Discharge:     Stable    Discharge Disposition      Discharge Medications     Discharge Medications      Patient Not Prescribed Medications Upon Discharge         Discharge Diet:     Activity at Discharge:     Follow-up Appointments  No future appointments.      Test Results Pending at Discharge  Pending Labs       Order Current Status    Tissue Pathology Exam In process             Risk for Readmission (LACE) Score: 3 (6/2/2024  6:00 AM)      Less Than 30 minutes spent in discharge activities for this patient    Signature:Electronically signed by Susan Anaya PA-C, 06/02/24, 12:29 PM EDT.

## 2024-06-03 ENCOUNTER — TELEPHONE (OUTPATIENT)
Dept: SURGERY | Facility: CLINIC | Age: 59
End: 2024-06-03
Payer: COMMERCIAL

## 2024-06-03 DIAGNOSIS — K81.9 CHOLECYSTITIS: Primary | ICD-10-CM

## 2024-06-03 LAB
LAB AP CASE REPORT: NORMAL
PATH REPORT.FINAL DX SPEC: NORMAL
PATH REPORT.GROSS SPEC: NORMAL

## 2024-06-03 RX ORDER — OXYCODONE HYDROCHLORIDE AND ACETAMINOPHEN 5; 325 MG/1; MG/1
1 TABLET ORAL EVERY 6 HOURS PRN
Qty: 20 TABLET | Refills: 0 | Status: SHIPPED | OUTPATIENT
Start: 2024-06-03

## 2024-06-03 NOTE — CASE MANAGEMENT/SOCIAL WORK
Case Management Discharge Note      Final Note: d/c home         Selected Continued Care - Discharged on 6/2/2024 Admission date: 5/31/2024 - Discharge disposition: Home or Self Care           Transportation Services  Private: Car    Final Discharge Disposition Code: 01 - home or self-care

## 2024-06-05 ENCOUNTER — TELEPHONE (OUTPATIENT)
Dept: SURGERY | Facility: CLINIC | Age: 59
End: 2024-06-05
Payer: COMMERCIAL

## 2024-06-05 NOTE — TELEPHONE ENCOUNTER
A few days ago patient called. Patient had surgery on 5/31 CHOLECYSTECTOMY LAPAROSCOPIC INTRAOPERATIVE CHOLANGIOGRAM. Patient states pain medicine is not touching his pain. He has been up all night due to being in so much pain. Asking if there is anything else you can recommend to help get him some relief?  Dr. Holloway called and spoke with patient directly.

## 2024-06-17 ENCOUNTER — OFFICE VISIT (OUTPATIENT)
Dept: SURGERY | Facility: CLINIC | Age: 59
End: 2024-06-17
Payer: COMMERCIAL

## 2024-06-17 VITALS
HEIGHT: 72 IN | BODY MASS INDEX: 27.9 KG/M2 | SYSTOLIC BLOOD PRESSURE: 128 MMHG | HEART RATE: 69 BPM | TEMPERATURE: 98.7 F | WEIGHT: 206 LBS | OXYGEN SATURATION: 99 % | DIASTOLIC BLOOD PRESSURE: 76 MMHG

## 2024-06-17 DIAGNOSIS — Z09 ENCOUNTER FOR FOLLOW-UP: Primary | ICD-10-CM

## 2024-06-17 PROCEDURE — 99024 POSTOP FOLLOW-UP VISIT: CPT | Performed by: SURGERY

## 2024-06-17 RX ORDER — PANTOPRAZOLE SODIUM 40 MG/1
1 TABLET, DELAYED RELEASE ORAL DAILY
COMMUNITY
Start: 2024-06-05

## 2024-06-17 NOTE — PROGRESS NOTES
CHIEF COMPLAINT:    Chief Complaint   Patient presents with    Post-op Follow-up     Po 2 week follow up inez ferrell 5-31-24       HISTORY OF PRESENT ILLNESS:    Virgilio Swain is a 58 y.o. male who underwent robotic cholecystectomy on 5/31/2024 followed by ERCP with clearance of the stone from his common bile duct the following day.  Pathology showed chronic cholecystitis with cholelithiasis.  This was discussed with him today.    Overall he has been doing fairly well at home until last night.  He states that he got up out of bed suddenly and seems to have hurt his back.  He has been eating and drinking without issue.  He denies significant abdominal pain.    EXAM:  Vitals:    06/17/24 0854   BP: 128/76   Pulse: 69   Temp: 98.7 °F (37.1 °C)   SpO2: 99%         Abdomen soft, incisions well-healed    ASSESSMENT:    Status post cholecystectomy    PLAN:    Overall appears to be well-healed.  He does have back pain from an incident overnight.  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  From my standpoint he can gradually increase his activity levels as tolerated.  He can see me as needed.          This document has been electronically signed by José Holloway MD on June 17, 2024 09:07 EDT

## 2024-06-17 NOTE — LETTER
June 17, 2024     Patient: Virgilio Swain   YOB: 1965   Date of Visit: 6/17/2024       To Whom It May Concern:    It is my medical opinion that Virgilio Swain may return to work on 6/24/2024 without restrictions .           Sincerely,          This document has been electronically signed by Joés Holloway MD on June 17, 2024 09:07 EDT        José Holloway MD    CC: No Recipients

## (undated) DEVICE — TROC BLADLES AIRSEAL/OPTI THRD 8X120MM 1P/U

## (undated) DEVICE — CANNULA SEAL

## (undated) DEVICE — TIP COVER ACCESSORY

## (undated) DEVICE — GENERAL LAPAROSCOPY CDS: Brand: MEDLINE INDUSTRIES, INC.

## (undated) DEVICE — ANTIBACTERIAL UNDYED BRAIDED (POLYGLACTIN 910), SYNTHETIC ABSORBABLE SUTURE: Brand: COATED VICRYL

## (undated) DEVICE — RETRIEVAL BALLOON CATHETER: Brand: EXTRACTOR™ PRO RX

## (undated) DEVICE — BLANKT WARM UPPR/BDY ARM/OUT 57X196CM

## (undated) DEVICE — ADHS SKIN PREMIERPRO EXOFIN TOPICAL HI/VISC .5ML

## (undated) DEVICE — ENDOPATH 5MM CURVED SCISSORS WITH MONOPOLAR CAUTERY: Brand: ENDOPATH

## (undated) DEVICE — SOLUTION,WATER,IRRIGATION,1000ML,STERILE: Brand: MEDLINE

## (undated) DEVICE — DEV POSITION LK AND BIOP CAP SYS

## (undated) DEVICE — SUREFIT, DUAL DISPERSIVE ELECTRODE, CONTACT QUALITY MONITOR: Brand: SUREFIT

## (undated) DEVICE — UNDERGLV SURG BIOGEL INDICATOR LF PF 7.5

## (undated) DEVICE — BLADELESS OBTURATOR: Brand: WECK VISTA

## (undated) DEVICE — CANNULATING SPHINCTEROTOME: Brand: JAGTOME™ REVOLUTION RX

## (undated) DEVICE — INDICATED FOR USE DURING OPEN AND LAPAROSCOPIC CHOLECYSTECTOMY PROCEDURES TO INJECT RADIOPAQUE MEDIA THROUGH THE CYSTIC DUCT INTO THE BILIARY TREE.: Brand: AEROSTAT®

## (undated) DEVICE — BG RETRV TISS SUPERBAG INTRO RIP/STOP NLY 10MM 240ML MD

## (undated) DEVICE — THE STERILE LIGHT HANDLE COVER IS USED WITH STERIS SURGICAL LIGHTING AND VISUALIZATION SYSTEMS.

## (undated) DEVICE — SLV SCD CALF HEMOFORCE DVT THERP REPROC MD

## (undated) DEVICE — SEAL

## (undated) DEVICE — THE STERILE CAMERA HANDLE COVER IS FOR USE WITH THE STERIS SURGICAL LIGHTING AND VISUALIZATION SYSTEMS.

## (undated) DEVICE — COLUMN DRAPE

## (undated) DEVICE — SUT VIC 0 UR6 27IN VCP603H

## (undated) DEVICE — PK ENDO GI 50

## (undated) DEVICE — GLV SURG BIOGEL LTX PF 7

## (undated) DEVICE — LAPAROVUE VISIBILITY SYSTEM LAPAROSCOPIC SOLUTIONS: Brand: LAPAROVUE

## (undated) DEVICE — ST TBG AIRSEAL BIF FLTR W/ACT/CHARCOAL/FLTR

## (undated) DEVICE — KT SURG TURNOVER 050

## (undated) DEVICE — SYR LUERLOK 30CC

## (undated) DEVICE — SOL IRRIG NACL 1000ML

## (undated) DEVICE — ARM DRAPE